# Patient Record
Sex: FEMALE | Race: WHITE | Employment: FULL TIME | URBAN - METROPOLITAN AREA
[De-identification: names, ages, dates, MRNs, and addresses within clinical notes are randomized per-mention and may not be internally consistent; named-entity substitution may affect disease eponyms.]

---

## 2019-03-06 ENCOUNTER — OFFICE VISIT (OUTPATIENT)
Dept: PRIMARY CARE CLINIC | Age: 54
End: 2019-03-06
Payer: COMMERCIAL

## 2019-03-06 VITALS
WEIGHT: 174 LBS | TEMPERATURE: 98.3 F | HEART RATE: 122 BPM | BODY MASS INDEX: 30.83 KG/M2 | HEIGHT: 63 IN | SYSTOLIC BLOOD PRESSURE: 101 MMHG | RESPIRATION RATE: 16 BRPM | OXYGEN SATURATION: 99 % | DIASTOLIC BLOOD PRESSURE: 68 MMHG

## 2019-03-06 DIAGNOSIS — J01.90 ACUTE BACTERIAL SINUSITIS: ICD-10-CM

## 2019-03-06 DIAGNOSIS — R05.9 COUGH: ICD-10-CM

## 2019-03-06 DIAGNOSIS — J02.9 SORE THROAT: Primary | ICD-10-CM

## 2019-03-06 DIAGNOSIS — B96.89 ACUTE BACTERIAL SINUSITIS: ICD-10-CM

## 2019-03-06 LAB — S PYO AG THROAT QL: NORMAL

## 2019-03-06 PROCEDURE — 3017F COLORECTAL CA SCREEN DOC REV: CPT | Performed by: PHYSICIAN ASSISTANT

## 2019-03-06 PROCEDURE — 1036F TOBACCO NON-USER: CPT | Performed by: PHYSICIAN ASSISTANT

## 2019-03-06 PROCEDURE — G8417 CALC BMI ABV UP PARAM F/U: HCPCS | Performed by: PHYSICIAN ASSISTANT

## 2019-03-06 PROCEDURE — G8484 FLU IMMUNIZE NO ADMIN: HCPCS | Performed by: PHYSICIAN ASSISTANT

## 2019-03-06 PROCEDURE — G8427 DOCREV CUR MEDS BY ELIG CLIN: HCPCS | Performed by: PHYSICIAN ASSISTANT

## 2019-03-06 PROCEDURE — 87880 STREP A ASSAY W/OPTIC: CPT | Performed by: PHYSICIAN ASSISTANT

## 2019-03-06 PROCEDURE — 99213 OFFICE O/P EST LOW 20 MIN: CPT | Performed by: PHYSICIAN ASSISTANT

## 2019-03-06 RX ORDER — ALBUTEROL SULFATE 90 UG/1
1-2 AEROSOL, METERED RESPIRATORY (INHALATION) EVERY 4 HOURS PRN
Qty: 1 INHALER | Refills: 3 | Status: SHIPPED | OUTPATIENT
Start: 2019-03-06

## 2019-03-06 RX ORDER — DEXTROMETHORPHAN HYDROBROMIDE AND PROMETHAZINE HYDROCHLORIDE 15; 6.25 MG/5ML; MG/5ML
5 SYRUP ORAL 4 TIMES DAILY PRN
Qty: 200 ML | Refills: 0 | Status: SHIPPED | OUTPATIENT
Start: 2019-03-06 | End: 2022-04-20

## 2019-03-06 RX ORDER — AMOXICILLIN AND CLAVULANATE POTASSIUM 875; 125 MG/1; MG/1
1 TABLET, FILM COATED ORAL 2 TIMES DAILY
Qty: 20 TABLET | Refills: 0 | Status: SHIPPED | OUTPATIENT
Start: 2019-03-06 | End: 2019-03-16

## 2019-03-06 ASSESSMENT — ENCOUNTER SYMPTOMS
WHEEZING: 1
COUGH: 1
SORE THROAT: 1
SHORTNESS OF BREATH: 1
RHINORRHEA: 0

## 2022-04-20 ENCOUNTER — OFFICE VISIT (OUTPATIENT)
Dept: INTERNAL MEDICINE | Age: 57
End: 2022-04-20
Payer: COMMERCIAL

## 2022-04-20 VITALS
WEIGHT: 186 LBS | TEMPERATURE: 97.8 F | BODY MASS INDEX: 34.23 KG/M2 | HEIGHT: 62 IN | HEART RATE: 107 BPM | OXYGEN SATURATION: 97 % | SYSTOLIC BLOOD PRESSURE: 116 MMHG | DIASTOLIC BLOOD PRESSURE: 68 MMHG

## 2022-04-20 DIAGNOSIS — J01.40 ACUTE PANSINUSITIS, RECURRENCE NOT SPECIFIED: Primary | ICD-10-CM

## 2022-04-20 PROCEDURE — 99203 OFFICE O/P NEW LOW 30 MIN: CPT | Performed by: NURSE PRACTITIONER

## 2022-04-20 RX ORDER — CETIRIZINE HYDROCHLORIDE 10 MG/1
10 TABLET ORAL DAILY
Qty: 30 TABLET | Refills: 0 | Status: SHIPPED | OUTPATIENT
Start: 2022-04-20 | End: 2022-05-20

## 2022-04-20 RX ORDER — FLUTICASONE PROPIONATE 50 MCG
1 SPRAY, SUSPENSION (ML) NASAL DAILY
Qty: 1 EACH | Refills: 1 | Status: SHIPPED | OUTPATIENT
Start: 2022-04-20

## 2022-04-20 SDOH — ECONOMIC STABILITY: FOOD INSECURITY: WITHIN THE PAST 12 MONTHS, YOU WORRIED THAT YOUR FOOD WOULD RUN OUT BEFORE YOU GOT MONEY TO BUY MORE.: NEVER TRUE

## 2022-04-20 SDOH — ECONOMIC STABILITY: FOOD INSECURITY: WITHIN THE PAST 12 MONTHS, THE FOOD YOU BOUGHT JUST DIDN'T LAST AND YOU DIDN'T HAVE MONEY TO GET MORE.: NEVER TRUE

## 2022-04-20 ASSESSMENT — SOCIAL DETERMINANTS OF HEALTH (SDOH): HOW HARD IS IT FOR YOU TO PAY FOR THE VERY BASICS LIKE FOOD, HOUSING, MEDICAL CARE, AND HEATING?: NOT HARD AT ALL

## 2022-04-20 ASSESSMENT — ENCOUNTER SYMPTOMS
SINUS PRESSURE: 1
SHORTNESS OF BREATH: 0
WHEEZING: 0
RHINORRHEA: 1
EYE ITCHING: 0
SINUS COMPLAINT: 1
COUGH: 1
EYE REDNESS: 0
EYE DISCHARGE: 0
SORE THROAT: 1

## 2022-04-20 ASSESSMENT — PATIENT HEALTH QUESTIONNAIRE - PHQ9
SUM OF ALL RESPONSES TO PHQ QUESTIONS 1-9: 0
1. LITTLE INTEREST OR PLEASURE IN DOING THINGS: 0
2. FEELING DOWN, DEPRESSED OR HOPELESS: 0
SUM OF ALL RESPONSES TO PHQ QUESTIONS 1-9: 0
SUM OF ALL RESPONSES TO PHQ9 QUESTIONS 1 & 2: 0
SUM OF ALL RESPONSES TO PHQ QUESTIONS 1-9: 0
SUM OF ALL RESPONSES TO PHQ QUESTIONS 1-9: 0

## 2022-04-20 NOTE — PATIENT INSTRUCTIONS
Patient Education        Sinusitis: Care Instructions  Your Care Instructions     Sinusitis is an infection of the lining of the sinus cavities in your head. Sinusitis often follows a cold. It causes pain and pressure in your head andface. In most cases, sinusitis gets better on its own in 1 to 2 weeks. But some mildsymptoms may last for several weeks. Sometimes antibiotics are needed. Follow-up care is a key part of your treatment and safety. Be sure to make and go to all appointments, and call your doctor if you are having problems. It's also a good idea to know your test results and keep alist of the medicines you take. How can you care for yourself at home?  Take an over-the-counter pain medicine, such as acetaminophen (Tylenol), ibuprofen (Advil, Motrin), or naproxen (Aleve). Read and follow all instructions on the label.  If the doctor prescribed antibiotics, take them as directed. Do not stop taking them just because you feel better. You need to take the full course of antibiotics.  Be careful when taking over-the-counter cold or flu medicines and Tylenol at the same time. Many of these medicines have acetaminophen, which is Tylenol. Read the labels to make sure that you are not taking more than the recommended dose. Too much acetaminophen (Tylenol) can be harmful.  Breathe warm, moist air from a steamy shower, a hot bath, or a sink filled with hot water. Avoid cold, dry air. Using a humidifier in your home may help. Follow the directions for cleaning the machine.  Use saline (saltwater) nasal washes. This can help keep your nasal passages open and wash out mucus and bacteria. You can buy saline nose drops at a grocery store or drugstore. Or you can make your own at home by adding 1 teaspoon of salt and 1 teaspoon of baking soda to 2 cups of distilled water. If you make your own, fill a bulb syringe with the solution, insert the tip into your nostril, and squeeze gently. Татьяна Swartz your nose.    Put a hot, wet towel or a warm gel pack on your face 3 or 4 times a day for 5 to 10 minutes each time.  Try a decongestant nasal spray like oxymetazoline (Afrin). Do not use it for more than 3 days in a row. Using it for more than 3 days can make your congestion worse. When should you call for help? Call your doctor now or seek immediate medical care if:     You have new or worse swelling or redness in your face or around your eyes.      You have a new or higher fever. Watch closely for changes in your health, and be sure to contact your doctor if:     You have new or worse facial pain.      The mucus from your nose becomes thicker (like pus) or has new blood in it.      You are not getting better as expected. Where can you learn more? Go to https://91datong.compecatarinaeb.Total Eclipse. org and sign in to your [employer] account. Enter Q695 in the [employer] box to learn more about \"Sinusitis: Care Instructions. \"     If you do not have an account, please click on the \"Sign Up Now\" link. Current as of: September 8, 2021               Content Version: 13.2  © 2006-2022 [employer]. Care instructions adapted under license by [facility] ([facility]). If you have questions about a medical condition or this instruction, always ask your healthcare professional. [place] [address] any warranty or liability for your use of this information. Patient Education        Sinusitis: Care Instructions  Your Care Instructions     Sinusitis is an infection of the lining of the sinus cavities in your head. Sinusitis often follows a cold. It causes pain and pressure in your head andface. In most cases, sinusitis gets better on its own in 1 to 2 weeks. But some mildsymptoms may last for several weeks. Sometimes antibiotics are needed. Follow-up care is a key part of your treatment and safety. Be sure to make and go to all appointments, and call your doctor if you are having problems.  It's also a good idea to know your test results and keep alist of the medicines you take. How can you care for yourself at home?  Take an over-the-counter pain medicine, such as acetaminophen (Tylenol), ibuprofen (Advil, Motrin), or naproxen (Aleve). Read and follow all instructions on the label.  If the doctor prescribed antibiotics, take them as directed. Do not stop taking them just because you feel better. You need to take the full course of antibiotics.  Be careful when taking over-the-counter cold or flu medicines and Tylenol at the same time. Many of these medicines have acetaminophen, which is Tylenol. Read the labels to make sure that you are not taking more than the recommended dose. Too much acetaminophen (Tylenol) can be harmful.  Breathe warm, moist air from a steamy shower, a hot bath, or a sink filled with hot water. Avoid cold, dry air. Using a humidifier in your home may help. Follow the directions for cleaning the machine.  Use saline (saltwater) nasal washes. This can help keep your nasal passages open and wash out mucus and bacteria. You can buy saline nose drops at a grocery store or drugstore. Or you can make your own at home by adding 1 teaspoon of salt and 1 teaspoon of baking soda to 2 cups of distilled water. If you make your own, fill a bulb syringe with the solution, insert the tip into your nostril, and squeeze gently. Debera Poplin your nose.  Put a hot, wet towel or a warm gel pack on your face 3 or 4 times a day for 5 to 10 minutes each time.  Try a decongestant nasal spray like oxymetazoline (Afrin). Do not use it for more than 3 days in a row. Using it for more than 3 days can make your congestion worse. When should you call for help? Call your doctor now or seek immediate medical care if:     You have new or worse swelling or redness in your face or around your eyes.      You have a new or higher fever.    Watch closely for changes in your health, and be sure to contact your doctor if:     You have new or worse facial pain.      The mucus from your nose becomes thicker (like pus) or has new blood in it.      You are not getting better as expected. Where can you learn more? Go to https://Fixmo Carrier Servicespeodelle(ye)BRAINeb.Meilapp.com. org and sign in to your [employer] account. Enter Z573 in the [facility] box to learn more about \"Sinusitis: Care Instructions. \"     If you do not have an account, please click on the \"Sign Up Now\" link. Current as of: September 8, 2021               Content Version: 13.2  © 9507-8378 Healthwise, Incorporated. Care instructions adapted under license by [facility] ([facility]). If you have questions about a medical condition or this instruction, always ask your healthcare professional. [address] any warranty or liability for your use of this information.

## 2022-04-20 NOTE — LETTER
[facility]  [address] [address]  Phone: [phone]  Fax: Liana Hobbs 281, APRN        April 20, 2022     Patient: Shemar Méndez   YOB: 1965   Date of Visit: 4/20/2022       To Whom it May Concern:    Shemar Méndez was seen in my clinic on 4/20/2022. She may return to work on 4/22/22. If you have any questions or concerns, please don't hesitate to call.     Sincerely,         COOPER Pena

## 2022-04-20 NOTE — PROGRESS NOTES
Ruben Trevino (:  1965) is a 49 y.o. female, New patient, here for evaluation of the following chief complaint(s):  Sinus Problem (x3 days cough wet, congestion, headache, ear ache both)      Vitals:    22 1815   BP: 116/68   Pulse: 107   Temp: 97.8 °F (36.6 °C)   SpO2: 97%       ASSESSMENT/PLAN:  1. Acute pansinusitis, recurrence not specified  -     fluticasone (FLONASE) 50 MCG/ACT nasal spray; 1 spray by Nasal route daily, Disp-1 each, R-1Normal  -     cetirizine (ZYRTEC) 10 MG tablet; Take 1 tablet by mouth daily, Disp-30 tablet, R-0Normal      Return if symptoms worsen or fail to improve, for follow up with PCP. SUBJECTIVE/OBJECTIVE:    Sinus Problem  This is a new problem. Episode onset: x3 days, cough, congestion, headache, and bilateral ear ache. The problem is unchanged. There has been no fever. Her pain is at a severity of 4/10. The pain is moderate. Associated symptoms include congestion, coughing, ear pain (bilateral), sinus pressure and a sore throat. Pertinent negatives include no chills, headaches, neck pain or shortness of breath. Treatments tried: otc cold medication. The treatment provided mild relief. Review of Systems   Constitutional: Positive for fatigue. Negative for chills and fever. HENT: Positive for congestion, ear pain (bilateral), postnasal drip, rhinorrhea, sinus pressure and sore throat. Eyes: Negative for discharge, redness and itching. Respiratory: Positive for cough. Negative for shortness of breath and wheezing. Cardiovascular: Negative for chest pain and palpitations. Musculoskeletal: Negative for arthralgias, myalgias and neck pain. Neurological: Negative for dizziness, light-headedness and headaches. Physical Exam  Vitals reviewed. Constitutional:       General: She is not in acute distress. Appearance: Normal appearance. HENT:      Right Ear: A middle ear effusion is present. Tympanic membrane is not erythematous.       Left Ear: A middle ear effusion is present. Tympanic membrane is not erythematous. Nose: Mucosal edema present. Right Turbinates: Swollen. Left Turbinates: Swollen. Right Sinus: Maxillary sinus tenderness and frontal sinus tenderness present. Left Sinus: Maxillary sinus tenderness and frontal sinus tenderness present. Mouth/Throat:      Lips: Pink. Mouth: Mucous membranes are moist.      Pharynx: Oropharynx is clear. No oropharyngeal exudate or posterior oropharyngeal erythema. Cardiovascular:      Rate and Rhythm: Normal rate and regular rhythm. Heart sounds: Normal heart sounds, S1 normal and S2 normal.   Pulmonary:      Effort: Pulmonary effort is normal. No respiratory distress. Breath sounds: Normal air entry. No decreased breath sounds, wheezing, rhonchi or rales. Skin:     General: Skin is warm and dry. Neurological:      Mental Status: She is alert and oriented to person, place, and time. Psychiatric:         Mood and Affect: Mood normal.         Behavior: Behavior is cooperative. An electronic signature was used to authenticate this note.     --COOPER Miranda

## 2022-05-13 ENCOUNTER — COMMUNITY OUTREACH (OUTPATIENT)
Dept: INTERNAL MEDICINE | Age: 57
End: 2022-05-13

## 2022-05-13 NOTE — PROGRESS NOTES
Patient's HM shows they are overdue for Mammogram, Colorectal Screening and Cervical Cancer Screening. [employer] [place] and  files searched without success. No results to attach to order nor HM updated. Note added to upcoming appointment to discuss Care Gap.         Fax sent to [facility] requesting Colonoscopy report

## 2022-05-13 NOTE — LETTER
Facsimile Transmission  Cover Sheet        Information contained in this transmission is for the sole use of the intended recipient(s) and may contain confidential and privileged information. Any unauthorized review, use, disclosure or distribution is prohibited. If you are not the intended recipient, please contact the sender for further instructions regarding the information.         To (Recipient):   [address] [place] [address] Records       Fax #:    [phone]          From:  [facility]) Physicians Fax Number:  [phone]    Sender:  Bebeto Nunez    Phone number:  [phone]    Date:  2022   Number of Pages:  ____________          Notes: Please fax most recent Colonoscopy (possibly 2017) results for              Anna Primrose  1965    _______________________________________________________________

## 2022-05-16 ENCOUNTER — OFFICE VISIT (OUTPATIENT)
Dept: INTERNAL MEDICINE | Age: 57
End: 2022-05-16
Payer: COMMERCIAL

## 2022-05-16 VITALS
DIASTOLIC BLOOD PRESSURE: 74 MMHG | BODY MASS INDEX: 34.6 KG/M2 | TEMPERATURE: 97.2 F | SYSTOLIC BLOOD PRESSURE: 120 MMHG | OXYGEN SATURATION: 99 % | HEIGHT: 62 IN | WEIGHT: 188 LBS | HEART RATE: 106 BPM

## 2022-05-16 DIAGNOSIS — R14.0 BLOATING SYMPTOM: ICD-10-CM

## 2022-05-16 DIAGNOSIS — R06.02 SHORTNESS OF BREATH: ICD-10-CM

## 2022-05-16 DIAGNOSIS — R53.83 FATIGUE, UNSPECIFIED TYPE: ICD-10-CM

## 2022-05-16 DIAGNOSIS — K62.5 RECTAL BLEEDING: ICD-10-CM

## 2022-05-16 DIAGNOSIS — R10.84 GENERALIZED ABDOMINAL PAIN: Primary | ICD-10-CM

## 2022-05-16 DIAGNOSIS — L30.9 DERMATITIS: ICD-10-CM

## 2022-05-16 DIAGNOSIS — F17.200 SMOKER: ICD-10-CM

## 2022-05-16 DIAGNOSIS — G44.221 CHRONIC TENSION-TYPE HEADACHE, INTRACTABLE: ICD-10-CM

## 2022-05-16 DIAGNOSIS — Z86.010 HISTORY OF COLONIC POLYPS: ICD-10-CM

## 2022-05-16 DIAGNOSIS — Z12.31 SCREENING MAMMOGRAM FOR BREAST CANCER: ICD-10-CM

## 2022-05-16 DIAGNOSIS — R00.2 PALPITATIONS: ICD-10-CM

## 2022-05-16 DIAGNOSIS — Z80.0 FAMILY HISTORY OF COLON CANCER: ICD-10-CM

## 2022-05-16 DIAGNOSIS — J45.20 MILD INTERMITTENT ASTHMA WITHOUT COMPLICATION: ICD-10-CM

## 2022-05-16 DIAGNOSIS — Z86.19 HISTORY OF HELICOBACTER PYLORI INFECTION: ICD-10-CM

## 2022-05-16 PROBLEM — G43.109 MIGRAINE WITH AURA AND WITHOUT STATUS MIGRAINOSUS, NOT INTRACTABLE: Status: ACTIVE | Noted: 2017-06-09

## 2022-05-16 PROBLEM — E55.9 VITAMIN D DEFICIENCY: Status: ACTIVE | Noted: 2017-06-09

## 2022-05-16 PROBLEM — J45.909 ASTHMA: Status: ACTIVE | Noted: 2017-07-24

## 2022-05-16 PROBLEM — Z86.0100 HISTORY OF COLONIC POLYPS: Status: ACTIVE | Noted: 2022-05-16

## 2022-05-16 PROBLEM — F51.01 PRIMARY INSOMNIA: Status: ACTIVE | Noted: 2017-06-09

## 2022-05-16 LAB
ALBUMIN SERPL-MCNC: 3.9 G/DL (ref 3.5–4.6)
ALP BLD-CCNC: 95 U/L (ref 40–130)
ALT SERPL-CCNC: 12 U/L (ref 0–33)
ANION GAP SERPL CALCULATED.3IONS-SCNC: 11 MEQ/L (ref 9–15)
AST SERPL-CCNC: 23 U/L (ref 0–35)
BASOPHILS ABSOLUTE: 0.1 K/UL (ref 0–0.2)
BASOPHILS RELATIVE PERCENT: 0.9 %
BILIRUB SERPL-MCNC: 0.4 MG/DL (ref 0.2–0.7)
BUN BLDV-MCNC: 15 MG/DL (ref 6–20)
CALCIUM SERPL-MCNC: 9.1 MG/DL (ref 8.5–9.9)
CHLORIDE BLD-SCNC: 108 MEQ/L (ref 95–107)
CO2: 24 MEQ/L (ref 20–31)
CREAT SERPL-MCNC: 0.56 MG/DL (ref 0.5–0.9)
EOSINOPHILS ABSOLUTE: 0.3 K/UL (ref 0–0.7)
EOSINOPHILS RELATIVE PERCENT: 4 %
GFR AFRICAN AMERICAN: >60
GFR NON-AFRICAN AMERICAN: >60
GLOBULIN: 3.2 G/DL (ref 2.3–3.5)
GLUCOSE BLD-MCNC: 90 MG/DL (ref 70–99)
HCT VFR BLD CALC: 40.8 % (ref 37–47)
HEMOGLOBIN: 13.1 G/DL (ref 12–16)
LYMPHOCYTES ABSOLUTE: 1.8 K/UL (ref 1–4.8)
LYMPHOCYTES RELATIVE PERCENT: 24.4 %
MCH RBC QN AUTO: 29.6 PG (ref 27–31.3)
MCHC RBC AUTO-ENTMCNC: 32.1 % (ref 33–37)
MCV RBC AUTO: 92.3 FL (ref 82–100)
MONOCYTES ABSOLUTE: 0.4 K/UL (ref 0.2–0.8)
MONOCYTES RELATIVE PERCENT: 5.8 %
NEUTROPHILS ABSOLUTE: 4.8 K/UL (ref 1.4–6.5)
NEUTROPHILS RELATIVE PERCENT: 64.9 %
PDW BLD-RTO: 16.5 % (ref 11.5–14.5)
PLATELET # BLD: 256 K/UL (ref 130–400)
POTASSIUM SERPL-SCNC: 4.3 MEQ/L (ref 3.4–4.9)
RBC # BLD: 4.42 M/UL (ref 4.2–5.4)
SODIUM BLD-SCNC: 143 MEQ/L (ref 135–144)
TOTAL PROTEIN: 7.1 G/DL (ref 6.3–8)
TSH REFLEX: 1.31 UIU/ML (ref 0.44–3.86)
WBC # BLD: 7.4 K/UL (ref 4.8–10.8)

## 2022-05-16 PROCEDURE — 99214 OFFICE O/P EST MOD 30 MIN: CPT | Performed by: NURSE PRACTITIONER

## 2022-05-16 RX ORDER — BUPROPION HYDROCHLORIDE 150 MG/1
150 TABLET, EXTENDED RELEASE ORAL 2 TIMES DAILY
Qty: 60 TABLET | Refills: 3 | Status: SHIPPED | OUTPATIENT
Start: 2022-05-16 | End: 2022-09-22 | Stop reason: ALTCHOICE

## 2022-05-16 RX ORDER — ALBUTEROL SULFATE 90 UG/1
2 AEROSOL, METERED RESPIRATORY (INHALATION) EVERY 6 HOURS PRN
Qty: 1 EACH | Refills: 1 | Status: SHIPPED | OUTPATIENT
Start: 2022-05-16 | End: 2022-06-13 | Stop reason: SDUPTHER

## 2022-05-16 RX ORDER — PERMETHRIN 50 MG/G
CREAM TOPICAL
Qty: 60 G | Refills: 1 | Status: CANCELLED | OUTPATIENT
Start: 2022-05-16

## 2022-05-16 ASSESSMENT — ENCOUNTER SYMPTOMS
WHEEZING: 0
BLOOD IN STOOL: 1
COUGH: 0
SHORTNESS OF BREATH: 1
RECTAL PAIN: 1
ABDOMINAL PAIN: 1
ABDOMINAL DISTENTION: 1
VOMITING: 0
NAUSEA: 1

## 2022-05-25 ENCOUNTER — HOSPITAL ENCOUNTER (OUTPATIENT)
Dept: CT IMAGING | Age: 57
Discharge: HOME | End: 2022-05-27
Payer: COMMERCIAL

## 2022-05-25 ENCOUNTER — HOSPITAL ENCOUNTER (OUTPATIENT)
Dept: WOMENS IMAGING | Age: 57
Discharge: HOME | End: 2022-05-27
Payer: COMMERCIAL

## 2022-05-25 DIAGNOSIS — K62.5 RECTAL BLEEDING: ICD-10-CM

## 2022-05-25 DIAGNOSIS — Z12.31 SCREENING MAMMOGRAM FOR BREAST CANCER: ICD-10-CM

## 2022-05-25 DIAGNOSIS — R10.84 GENERALIZED ABDOMINAL PAIN: ICD-10-CM

## 2022-05-25 PROCEDURE — 77063 BREAST TOMOSYNTHESIS BI: CPT

## 2022-05-25 PROCEDURE — 2500000003 HC RX 250 WO HCPCS: Performed by: NURSE PRACTITIONER

## 2022-05-25 PROCEDURE — 74177 CT ABD & PELVIS W/CONTRAST: CPT

## 2022-05-25 PROCEDURE — 6360000004 HC RX CONTRAST MEDICATION: Performed by: NURSE PRACTITIONER

## 2022-05-25 RX ADMIN — IOPAMIDOL 100 ML: 755 INJECTION, SOLUTION INTRAVENOUS at 09:54

## 2022-05-25 RX ADMIN — BARIUM SULFATE 450 ML: 20 SUSPENSION ORAL at 08:51

## 2022-05-26 NOTE — RESULT ENCOUNTER NOTE
Her CT of abdomen was unremarkable. Please encourage her to get scheduled with gastro as referred at her appt.

## 2022-05-27 ENCOUNTER — TELEPHONE (OUTPATIENT)
Dept: INTERNAL MEDICINE | Age: 57
End: 2022-05-27

## 2022-05-27 NOTE — TELEPHONE ENCOUNTER
Since her symptoms have been going on a little while, I do feel it is ok to be seen in 2 weeks by the specialist.

## 2022-05-27 NOTE — TELEPHONE ENCOUNTER
Pt has the consult scheduled for 6/17/22,(colonoscopy) wants to know if this is scheduled soon enough? She said that she was told to schedule asap, and that doesn't seem soon enough to her?

## 2022-05-27 NOTE — RESULT ENCOUNTER NOTE
Unremarkable means nothing abnormal and dangerous was found. Did show full of poop and some mildly enlarged lymph nodes in groin that are likely related to the stool backup but don't tell us anything otherwise. NO tumors or infection were seen. Needs the GI referral to have a colonoscopy to inspect inside her intestines to make sure nothing causing a blockage (like a tumor) contributing to her symptoms.  So, CT is good just need the colonoscopy for further info

## 2022-06-13 ENCOUNTER — OFFICE VISIT (OUTPATIENT)
Dept: GASTROENTEROLOGY | Age: 57
End: 2022-06-13
Payer: COMMERCIAL

## 2022-06-13 VITALS
WEIGHT: 185.6 LBS | SYSTOLIC BLOOD PRESSURE: 110 MMHG | HEIGHT: 62 IN | BODY MASS INDEX: 34.16 KG/M2 | DIASTOLIC BLOOD PRESSURE: 70 MMHG | HEART RATE: 87 BPM | OXYGEN SATURATION: 98 %

## 2022-06-13 DIAGNOSIS — K62.5 RECTAL BLEEDING: Primary | ICD-10-CM

## 2022-06-13 PROCEDURE — 99204 OFFICE O/P NEW MOD 45 MIN: CPT | Performed by: SPECIALIST

## 2022-06-13 RX ORDER — SODIUM, POTASSIUM,MAG SULFATES 17.5-3.13G
SOLUTION, RECONSTITUTED, ORAL ORAL
Qty: 354 ML | Refills: 0 | Status: SHIPPED | OUTPATIENT
Start: 2022-06-13 | End: 2022-08-22

## 2022-06-13 ASSESSMENT — ENCOUNTER SYMPTOMS
VOMITING: 0
ABDOMINAL PAIN: 1
EYES NEGATIVE: 1
RECTAL PAIN: 0
RESPIRATORY NEGATIVE: 1
DIARRHEA: 1
BLOOD IN STOOL: 1
ANAL BLEEDING: 0
ABDOMINAL DISTENTION: 0
CONSTIPATION: 0
NAUSEA: 0

## 2022-06-13 NOTE — PROGRESS NOTES
Gastroenterology Clinic Visit    [facility]  [id number]  Chief Complaint   Patient presents with    New Patient     Nephstephanie jacobsen with Stage 4 colon cancer in 12/2020.  Bloated    Abdominal Pain     last colonoscopy about 3 years ago.  Rectal Pain    Nausea    Diarrhea       HPI: 49 y.o. female presents to the clinic with history of altered bowel habits and rectal bleeding since last few months, patient feels that she has to strain more even when the stool consistency is soft. Patient also reports having tenesmus. ,  Patient also describes having abdominal discomfort and bloating and also has nausea and emesis especially when she tries to have a BM. Also reports having dizziness and diaphoresis, no history of known cardiac issues. No history of weight loss, no fever or chills. Ports seeing mucus in the stool social history smokes e-cigarettes does not drink alcohol, family history nephew has colon cancer  Review of Systems   Constitutional: Negative. HENT: Negative. Eyes: Negative. Respiratory: Negative. Cardiovascular: Positive for chest pain. Gastrointestinal: Positive for abdominal pain, blood in stool and diarrhea. Negative for abdominal distention, anal bleeding, constipation, nausea, rectal pain and vomiting. Endocrine: Negative. Genitourinary: Negative. Musculoskeletal: Negative. Skin: Negative. Allergic/Immunologic: Negative for food allergies. Neurological: Negative. Hematological: Negative. Psychiatric/Behavioral: Negative. Past Medical History:   Diagnosis Date    H. pylori infection     Hyperplastic colonic polyp       No past surgical history on file. Current Outpatient Medications on File Prior to Visit   Medication Sig Dispense Refill    betamethasone valerate (VALISONE) 0.1 % cream Apply topically 2 times daily.  50 g 2    buPROPion (WELLBUTRIN SR) 150 MG extended release tablet Take 1 tablet by mouth 2 times daily 60 tablet 3    fluticasone (FLONASE) 50 MCG/ACT nasal spray 1 spray by Nasal route daily (Patient taking differently: 1 spray by Nasal route daily as needed for Rhinitis or Allergies ) 1 each 1    albuterol sulfate HFA (PROAIR HFA) 108 (90 Base) MCG/ACT inhaler Inhale 1-2 puffs into the lungs every 4 hours as needed for Wheezing or Shortness of Breath 1 Inhaler 3     No current facility-administered medications on file prior to visit. Family History   Problem Relation Age of Onset   Shaye Me Cancer Mother         breast cancer    Breast Cancer Mother     Asthma Father     Migraines Sister     Cancer Nephew 29        colon    Colon Cancer Nephew 29        terminal      Social History     Socioeconomic History    Marital status:      Spouse name: None    Number of children: None    Years of education: None    Highest education level: None   Occupational History    None   Tobacco Use    Smoking status: Former Smoker     Quit date: 2019     Years since quitting: 3.4    Smokeless tobacco: Current User    Tobacco comment: vaps on occasion   Substance and Sexual Activity    Alcohol use: No     Alcohol/week: 0.0 standard drinks    Drug use: No    Sexual activity: None   Other Topics Concern    None   Social History Narrative    None     Social Determinants of Health     Financial Resource Strain: Low Risk     Difficulty of Paying Living Expenses: Not hard at all   Food Insecurity: No Food Insecurity    Worried About Running Out of Food in the Last Year: Never true    Sandeep of Food in the Last Year: Never true   Transportation Needs:     Lack of Transportation (Medical): Not on file    Lack of Transportation (Non-Medical):  Not on file   Physical Activity:     Days of Exercise per Week: Not on file    Minutes of Exercise per Session: Not on file   Stress:     Feeling of Stress : Not on file   Social Connections:     Frequency of Communication with Friends and Family: Not on file    Frequency of Social Gatherings with Friends and Family: Not on file    Attends Jehovah's witness Services: Not on file    Active Member of Clubs or Organizations: Not on file    Attends Club or Organization Meetings: Not on file    Marital Status: Not on file   Intimate Partner Violence:     Fear of Current or Ex-Partner: Not on file    Emotionally Abused: Not on file    Physically Abused: Not on file    Sexually Abused: Not on file   Housing Stability:     Unable to Pay for Housing in the Last Year: Not on file    Number of [place] in the Last Year: Not on file    Unstable Housing in the Last Year: Not on file       Blood pressure 110/70, pulse 87, height 5' 2\" (1.575 m), weight 185 lb 9.6 oz (84.2 kg), SpO2 98 %, not currently breastfeeding. Physical Exam  Constitutional:       Appearance: She is well-developed. HENT:      Head: Normocephalic and atraumatic. Eyes:      Conjunctiva/sclera: Conjunctivae normal.      Pupils: Pupils are equal, round, and reactive to light. Cardiovascular:      Rate and Rhythm: Normal rate. Pulmonary:      Effort: Pulmonary effort is normal.   Abdominal:      General: Bowel sounds are normal.      Palpations: Abdomen is soft. Comments: Soft surgical scar from previous cholecystectomy tenderness noted in the right lower quadrant and epigastric area   Musculoskeletal:         General: Normal range of motion. Cervical back: Normal range of motion. Skin:     General: Skin is warm. Neurological:      Mental Status: She is alert.          Laboratory, Pathology, Radiology reviewed indetail with relevant important investigations summarized below:  Lab Results   Component Value Date    WBC 7.4 05/16/2022    HGB 13.1 05/16/2022    HCT 40.8 05/16/2022    MCV 92.3 05/16/2022     05/16/2022     Lab Results   Component Value Date    ALT 12 05/16/2022    AST 23 05/16/2022    ALKPHOS 95 05/16/2022    BILITOT 0.4 05/16/2022       CT ABDOMEN PELVIS W IV CONTRAST Additional Contrast? Radiologist Recommendation    Result Date: 5/25/2022  EXAM: CT ABDOMEN PELVIS W IV CONTRAST COMPARISON: None. HISTORY:  61-year-old female with generalized abdominal pain and rectal bleeding. TECHNIQUE: Nonionic contrast enhanced helical abdomen and pelvis CT was performed. Coronal and sagittal reconstructions also obtained. Automated dose exposure control was used for this exam Contrast: 100 ml of intravenous Isovue 370 was administered. Oral contrast was also administered. FINDINGS: Support devices: Lower thorax: Limited evaluation of the lower chest demonstrates clear lung bases bilaterally. Solid organs: The liver, spleen, pancreas, and adrenal glands are normal. Biliary system: No evidence of biliary ductal dilatation. Gallbladder appears normal. Genitourinary: The kidneys are normal in appearance bilaterally with no evidence of hydronephrosis. No stones are appreciated. The bladder is unremarkable. Anteverted uterus demonstrates no evidence of masses. Gastrointestinal: The stomach, small and large bowel are normal in caliber without evidence of focal wall thickening. There is no evidence of bowel obstruction. Abundance of stool is visualized in the right bowel. No evidence of acute extravasation/bleeding is visualized within the bowel loops. Appendix: Within normal limits. No thickening of adjacent fat stranding. Fluid Collections:None Lymph nodes: Bilateral inguinal lymphadenopathy is seen. Vascular structures: Visualized vascular structures appear normal. Osseous and soft tissue structures: Bone windows demonstrate no suspicious osteolytic or osteoblastic lesions. No fracture identified. No evidence of acute abdominal pathology is seen. Abundance of stool is visualized in the large bowel. No evidence of adnexal masses is seen. Bilateral inguinal lymphadenopathy is seen.      [facility] DIGITAL SCREEN BILATERAL    Result Date: 5/28/2022  EXAMINATION: [facility] DIGITAL SCREEN BILATERAL CLINICAL HISTORY:Z12.31 Screening mammogram for breast cancer ICD10 COMPARISON: December 21, 2017 and May 24, 2017 FINDINGS:3-D tomosynthesis imaging of the bilateral breasts was performed. There are scattered fibroglandular densities within each breast.   There are no suspicious masses, areas of architectural distortion or suspicious areas of microcalcifications. CAD analysis was performed and used in the interpretation. BI-RADS 1: NEGATIVE MAMMOGRAM. ROUTINE FOLLOW-UP MAMMOGRAPHY IS SUGGESTED IN ONE YEAR. Board Certified Radiologists. Accredited by the ACR and FDA. MAMMOGRAPHY IS VERY IMPORTANT TO YOUR HEALTH. THE AMERICAN CANCER SOCIETY GUIDELINES RECOMMEND THAT WOMEN 36YEARS OF AGE AND OLDER SHOULD HAVE A MAMMOGRAM EVERY YEAR. A REMINDER LETTER WILL BE SENT AT THE APPROPRIATE TIME.  THIS FACILITY UTILIZES A REMINDER SYSTEM TO ENSURE ALL PATIENTS RECEIVE REMINDER NOTIFICATIONS AT THE APPROPRIATE TIME BASED ON THE RECOMMENDATIONS OF THIS EXAM. THIS INCLUDES REMINDERS FOR ROUTINE  SCREENING MAMMOGRAMS, DIAGNOSTIC MAMMOGRAMS IN WHICH THE PATIENT IS ASKED TO RETURN FOR ADDITIONAL VIEWS, OR OTHER BREAST IMAGING INTERVENTIONS WHEN APPROPRIATE. THE PATIENT WILL BE PLACED IN THE APPROPRIATE REMINDER SYSTEM INCLUDING A REMINDER AT THE APPROPRIATE TIME FOR ANY PENDING ADDITIONAL VIEWS. Endoscopic investigations:     Assessmentand Plan:  49 y.o. female with history of altered bowel habits with rectal bleeding and tenesmus, CT abdomen pelvis did not show any significant abnormality, rule out IBD or neoplasm or hemorrhoids, patient has a family history of colon cancer. We will schedule colonoscopy. Recent CBC and chemistry was unremarkable   Diagnosis Orders   1. Rectal bleeding  Endoscopy, colon, diagnostic     Return in about 2 months (around 8/13/2022).     Bernarda Santiago MD   Staff Gastroenterologist  [facility]    Please note this report has been partially produced using speech recognition software and may cause contain errors related to thatsystem including grammar, punctuation and spelling as well as words and phrases that may seem inappropriate. If there are questions or concerns please feel free to contact me to clarify.

## 2022-06-16 ENCOUNTER — ANESTHESIA (OUTPATIENT)
Dept: ENDOSCOPY | Age: 57
End: 2022-06-16
Payer: COMMERCIAL

## 2022-06-16 ENCOUNTER — ANCILLARY PROCEDURE (OUTPATIENT)
Dept: ENDOSCOPY | Age: 57
End: 2022-06-16
Attending: SPECIALIST
Payer: COMMERCIAL

## 2022-06-16 ENCOUNTER — ANESTHESIA EVENT (OUTPATIENT)
Dept: ENDOSCOPY | Age: 57
End: 2022-06-16
Payer: COMMERCIAL

## 2022-06-16 ENCOUNTER — HOSPITAL ENCOUNTER (OUTPATIENT)
Age: 57
Setting detail: OUTPATIENT SURGERY
Discharge: HOME | End: 2022-06-16
Attending: SPECIALIST | Admitting: SPECIALIST
Payer: COMMERCIAL

## 2022-06-16 VITALS
HEIGHT: 62 IN | OXYGEN SATURATION: 96 % | SYSTOLIC BLOOD PRESSURE: 108 MMHG | TEMPERATURE: 97.2 F | BODY MASS INDEX: 34.04 KG/M2 | RESPIRATION RATE: 18 BRPM | WEIGHT: 185 LBS | HEART RATE: 80 BPM | DIASTOLIC BLOOD PRESSURE: 60 MMHG

## 2022-06-16 DIAGNOSIS — K62.5 RECTAL BLEEDING: ICD-10-CM

## 2022-06-16 PROCEDURE — 7100000010 HC PHASE II RECOVERY - FIRST 15 MIN: Performed by: SPECIALIST

## 2022-06-16 PROCEDURE — 2580000003 HC RX 258: Performed by: SPECIALIST

## 2022-06-16 PROCEDURE — 2500000003 HC RX 250 WO HCPCS: Performed by: NURSE ANESTHETIST, CERTIFIED REGISTERED

## 2022-06-16 PROCEDURE — 88305 TISSUE EXAM BY PATHOLOGIST: CPT

## 2022-06-16 PROCEDURE — 6360000002 HC RX W HCPCS: Performed by: NURSE ANESTHETIST, CERTIFIED REGISTERED

## 2022-06-16 PROCEDURE — 45380 COLONOSCOPY AND BIOPSY: CPT | Performed by: SPECIALIST

## 2022-06-16 PROCEDURE — 2580000003 HC RX 258

## 2022-06-16 PROCEDURE — 3700000001 HC ADD 15 MINUTES (ANESTHESIA): Performed by: SPECIALIST

## 2022-06-16 PROCEDURE — 6370000000 HC RX 637 (ALT 250 FOR IP): Performed by: SPECIALIST

## 2022-06-16 PROCEDURE — 7100000011 HC PHASE II RECOVERY - ADDTL 15 MIN: Performed by: SPECIALIST

## 2022-06-16 PROCEDURE — 2709999900 HC NON-CHARGEABLE SUPPLY: Performed by: SPECIALIST

## 2022-06-16 PROCEDURE — 3700000000 HC ANESTHESIA ATTENDED CARE: Performed by: SPECIALIST

## 2022-06-16 PROCEDURE — 3609027000 HC COLONOSCOPY: Performed by: SPECIALIST

## 2022-06-16 RX ORDER — SODIUM CHLORIDE 9 MG/ML
INJECTION, SOLUTION INTRAVENOUS
Status: COMPLETED
Start: 2022-06-16 | End: 2022-06-16

## 2022-06-16 RX ORDER — MAGNESIUM HYDROXIDE 1200 MG/15ML
LIQUID ORAL PRN
Status: DISCONTINUED | OUTPATIENT
Start: 2022-06-16 | End: 2022-06-16 | Stop reason: ALTCHOICE

## 2022-06-16 RX ORDER — PROPOFOL 10 MG/ML
INJECTION, EMULSION INTRAVENOUS PRN
Status: DISCONTINUED | OUTPATIENT
Start: 2022-06-16 | End: 2022-06-16 | Stop reason: SDUPTHER

## 2022-06-16 RX ORDER — LIDOCAINE HYDROCHLORIDE 20 MG/ML
INJECTION, SOLUTION INFILTRATION; PERINEURAL PRN
Status: DISCONTINUED | OUTPATIENT
Start: 2022-06-16 | End: 2022-06-16 | Stop reason: SDUPTHER

## 2022-06-16 RX ORDER — SODIUM CHLORIDE 9 MG/ML
INJECTION, SOLUTION INTRAVENOUS CONTINUOUS
Status: DISCONTINUED | OUTPATIENT
Start: 2022-06-16 | End: 2022-06-16 | Stop reason: HOSPADM

## 2022-06-16 RX ORDER — SIMETHICONE 20 MG/.3ML
EMULSION ORAL PRN
Status: DISCONTINUED | OUTPATIENT
Start: 2022-06-16 | End: 2022-06-16 | Stop reason: ALTCHOICE

## 2022-06-16 RX ADMIN — LIDOCAINE HYDROCHLORIDE 30 MG: 20 INJECTION, SOLUTION INFILTRATION; PERINEURAL at 11:26

## 2022-06-16 RX ADMIN — SODIUM CHLORIDE 500 ML: 9 INJECTION, SOLUTION INTRAVENOUS at 10:30

## 2022-06-16 RX ADMIN — SODIUM CHLORIDE: 9 INJECTION, SOLUTION INTRAVENOUS at 10:56

## 2022-06-16 RX ADMIN — PROPOFOL 300 MG: 10 INJECTION, EMULSION INTRAVENOUS at 11:26

## 2022-06-16 ASSESSMENT — LIFESTYLE VARIABLES: SMOKING_STATUS: 1

## 2022-06-16 NOTE — ANESTHESIA POSTPROCEDURE EVALUATION
Department of Anesthesiology  Postprocedure Note    Patient: Elvis Hodge  MRN: [id number]  YOB: 1965  Date of evaluation: 6/16/2022  Time:  11:50 AM     Procedure Summary     Date: 06/16/22 Room / Location: [address] [place] [address]    Anesthesia Start: 7636 Anesthesia Stop:     Procedure: COLONOSCOPY WITH POLYPECTOMY (N/A ) Diagnosis:       Rectal bleeding      (Rectal bleeding [K62.5])    Surgeons: Kathryn Cleary MD Responsible Provider: COOPER Montalvo CRNA    Anesthesia Type: MAC ASA Status: 2          Anesthesia Type: No value filed. Argenis Phase I: Argenis Score: 10    Argenis Phase II:      Last vitals: Reviewed and per EMR flowsheets.        Anesthesia Post Evaluation    Patient location during evaluation: PACU  Patient participation: waiting for patient participation  Level of consciousness: sleepy but conscious  Pain score: 1  Airway patency: patent  Nausea & Vomiting: no nausea and no vomiting  Complications: no  Cardiovascular status: hemodynamically stable  Respiratory status: acceptable and nasal cannula  Hydration status: euvolemic  Comments: Report to RN, normal sinus rhythm

## 2022-06-16 NOTE — H&P
Patient Name: Isela Landa  : 1965  MRN: [id number]  DATE: 22      ENDOSCOPY  History and Physical    Procedure:    [x] Diagnostic Colonoscopy       [] Screening Colonoscopy  [] EGD      [] ERCP      [] EUS       [] Other    [x] Previous office notes/History and Physical reviewed from the patients chart. Please see EMR for further details of HPI. I have examined the patient's status immediately prior to the procedure and:      Indications/HPI:    []Abdominal Pain  []Cancer- GI/Lung  []Fhx of colon CA/polyps  []History of Polyps  []Telless   []Melena  []Abnormal Imaging  []Dysphagia    []Persistent Pneumonia  []Anemia  []Food Impaction  []History of Polyps  []GI Bleed  []Pulmonary nodule/Mass  []Change in bowel habits []Heartburn/Reflux  [x]Rectal Bleed (BRBPR)  []Chest Pain - Non Cardiac []Heme (+) Stoo  l[]Ulcers  []Constipation  []Hemoptysis   []Varices  []Diarrhea  []Hypoxemia  []Nausea/Vomiting  []Screening   []Crohns/Colitis  []Other:   Anesthesia:   [x] MAC [] Moderate Sedation   [] General   [] None     ROS: 12 pt Review of Symptoms was negative unless mentioned above    Medications:   Prior to Admission medications    Medication Sig Start Date End Date Taking? Authorizing Provider   Na Sulfate-K Sulfate-Mg Sulf (SUPREP) 17.5-3.13-1.6 GM/177ML SOLN solution As directed 22   Belle Lang MD   betamethasone valerate (VALISONE) 0.1 % cream Apply topically 2 times daily.  22   Saira COOPER Hsu - CNP   buPROPion [facility] SR) 150 MG extended release tablet Take 1 tablet by mouth 2 times daily 22   [facility] COOPER [place]su - AIME   fluticasone [facility]) 50 MCG/ACT nasal spray 1 spray by Nasal route daily  Patient not taking: Reported on 2022   COOPER Mata   albuterol sulfate HFA (PROAIR HFA) 108 (90 Base) MCG/ACT inhaler Inhale 1-2 puffs into the lungs every 4 hours as needed for Wheezing or Shortness of Breath 3/6/19   MARCE Frias       Allergies: No Known Allergies     History of allergic reaction to anesthesia:  No    Past Medical History:  Past Medical History:   Diagnosis Date    H. pylori infection     Hyperplastic colonic polyp        Past Surgical History:  History reviewed. No pertinent surgical history. Social History:  Social History     Tobacco Use    Smoking status: Former Smoker     Quit date: 2019     Years since quitting: 3.4    Smokeless tobacco: Current User    Tobacco comment: vaps on occasion   Vaping Use    Vaping Use: Some days    Substances: Nicotine   Substance Use Topics    Alcohol use: No     Alcohol/week: 0.0 standard drinks    Drug use: No       Vital Signs:   Vitals:    06/16/22 1023   BP: 109/67   Pulse: 83   Resp: 16   Temp: 97.2 °F (36.2 °C)   SpO2: 98%        Physical Exam:  Cardiac:  [x]WNL  []Comments:  Pulmonary:  [x]WNL   []Comments:   Neuro/Mental Status:  [x]WNL  []Comments:  Abdominal:  [x]WNL    []Comments:  Other:   []WNL  []Comments:    Informed Consent:  The risks and benefits of the procedure have been discussed with either the patient or if they cannot consent, their representative. Assessment:  Patient examined and appropriate for planned sedation and procedure. Plan:  Proceed with planned sedation and procedure as above.     Marlon Sher MD  11:15 AM

## 2022-06-16 NOTE — ANESTHESIA PRE PROCEDURE
Department of Anesthesiology  Preprocedure Note       Name:  Giuliano Jorgensen   Age:  49 y.o.  :  1965                                          MRN:  [id number]         Date:  2022      Surgeon: Miah Morris):  Gosia Garcia MD    Procedure: Procedure(s):  COLONOSCOPY DIAGNOSTIC    Medications prior to admission:   Prior to Admission medications    Medication Sig Start Date End Date Taking? Authorizing Provider   Na Sulfate-K Sulfate-Mg Sulf (SUPREP) 17.5-3.13-1.6 GM/177ML SOLN solution As directed 22   Gosia Garcia MD   betamethasone valerate (VALISONE) 0.1 % cream Apply topically 2 times daily. 22   COOPER Wheeler CNP   buPROPion [facility]) 150 MG extended release tablet Take 1 tablet by mouth 2 times daily 22   COOPER Wheeler CNP   fluticasone [facility]) 50 MCG/ACT nasal spray 1 spray by Nasal route daily  Patient not taking: Reported on 2022   COOPER Escamilla   albuterol sulfate HFA (PROAIR HFA) 108 (90 Base) MCG/ACT inhaler Inhale 1-2 puffs into the lungs every 4 hours as needed for Wheezing or Shortness of Breath 3/6/19   MARCE Gray       Current medications:    Current Facility-Administered Medications   Medication Dose Route Frequency Provider Last Rate Last Admin    0.9 % sodium chloride infusion   IntraVENous Continuous Gosia Garcia MD           Allergies:  No Known Allergies    Problem List:    Patient Active Problem List   Diagnosis Code    Chronic tension-type headache, intractable G44.221    Generalized abdominal pain R10.84    History of colonic polyps Z86.010    Asthma J45.909    Migraine with aura and without status migrainosus, not intractable G43. 109    Primary insomnia F51.01    Tobacco use disorder F17.200    Vitamin D deficiency E55.9       Past Medical History:        Diagnosis Date    H. pylori infection     Hyperplastic colonic polyp        Past Surgical History:  History reviewed.  No pertinent surgical history. Social History:    Social History     Tobacco Use    Smoking status: Former Smoker     Quit date: 2019     Years since quitting: 3.4    Smokeless tobacco: Current User    Tobacco comment: vaps on occasion   Substance Use Topics    Alcohol use: No     Alcohol/week: 0.0 standard drinks                                Ready to quit: Not Answered  Counseling given: Not Answered  Comment: vaps on occasion      Vital Signs (Current):   Vitals:    06/16/22 1023   BP: 109/67   Pulse: 83   Resp: 16   Temp: 36.2 °C (97.2 °F)   TempSrc: Temporal   SpO2: 98%   Weight: 185 lb (83.9 kg)   Height: 5' 2\" (1.575 m)                                              BP Readings from Last 3 Encounters:   06/16/22 109/67   06/13/22 110/70   05/16/22 120/74       NPO Status: Time of last liquid consumption: 0530                        Time of last solid consumption: 1800                        Date of last liquid consumption: 06/16/22                        Date of last solid food consumption: 06/14/22    BMI:   Wt Readings from Last 3 Encounters:   06/16/22 185 lb (83.9 kg)   06/13/22 185 lb 9.6 oz (84.2 kg)   05/16/22 188 lb (85.3 kg)     Body mass index is 33.84 kg/m². CBC:   Lab Results   Component Value Date    WBC 7.4 05/16/2022    RBC 4.42 05/16/2022    HGB 13.1 05/16/2022    HCT 40.8 05/16/2022    MCV 92.3 05/16/2022    RDW 16.5 05/16/2022     05/16/2022       CMP:   Lab Results   Component Value Date     05/16/2022    K 4.3 05/16/2022     05/16/2022    CO2 24 05/16/2022    BUN 15 05/16/2022    CREATININE 0.56 05/16/2022    GFRAA >60.0 05/16/2022    LABGLOM >60.0 05/16/2022    GLUCOSE 90 05/16/2022    PROT 7.1 05/16/2022    CALCIUM 9.1 05/16/2022    BILITOT 0.4 05/16/2022    ALKPHOS 95 05/16/2022    AST 23 05/16/2022    ALT 12 05/16/2022       POC Tests: No results for input(s): POCGLU, POCNA, POCK, POCCL, POCBUN, POCHEMO, POCHCT in the last 72 hours.     Coags: No results found for: PROTIME, INR, APTT    HCG (If Applicable): No results found for: PREGTESTUR, PREGSERUM, HCG, HCGQUANT     ABGs: No results found for: PHART, PO2ART, HHI4IWD, EYZ5BZG, BEART, U0RQHOZU     Type & Screen (If Applicable):  No results found for: LABABO, LABRH    Drug/Infectious Status (If Applicable):  No results found for: HIV, HEPCAB    COVID-19 Screening (If Applicable): No results found for: COVID19        Anesthesia Evaluation  Patient summary reviewed and Nursing notes reviewed  Airway: Mallampati: II  TM distance: >3 FB   Neck ROM: full  Mouth opening: > = 3 FB   Dental: normal exam         Pulmonary: breath sounds clear to auscultation  (+) asthma: current smoker                           Cardiovascular:  Exercise tolerance: no interval change,         NYHA Classification: II    Rhythm: regular  Rate: normal           Beta Blocker:  Not on Beta Blocker         Neuro/Psych:   (+) headaches: migraine headaches, tension headaches,             GI/Hepatic/Renal:   (+) bowel prep, morbid obesity          Endo/Other: Negative Endo/Other ROS                    Abdominal:   (+) obese,     Abdomen: soft. Vascular: negative vascular ROS. Other Findings:           Anesthesia Plan      MAC     ASA 2       Induction: intravenous. continuous noninvasive hemodynamic monitor    Anesthetic plan and risks discussed with patient. Plan discussed with attending.                     COOPER Miller - CRNA   6/16/2022

## 2022-08-22 ENCOUNTER — OFFICE VISIT (OUTPATIENT)
Dept: INTERNAL MEDICINE | Age: 57
End: 2022-08-22
Payer: COMMERCIAL

## 2022-08-22 VITALS
TEMPERATURE: 98.2 F | HEIGHT: 62 IN | HEART RATE: 83 BPM | BODY MASS INDEX: 33.86 KG/M2 | OXYGEN SATURATION: 99 % | RESPIRATION RATE: 16 BRPM | SYSTOLIC BLOOD PRESSURE: 116 MMHG | DIASTOLIC BLOOD PRESSURE: 68 MMHG | WEIGHT: 184 LBS

## 2022-08-22 DIAGNOSIS — K62.5 RECTAL BLEEDING: ICD-10-CM

## 2022-08-22 DIAGNOSIS — G44.221 CHRONIC TENSION-TYPE HEADACHE, INTRACTABLE: ICD-10-CM

## 2022-08-22 DIAGNOSIS — M79.7 FIBROMYALGIA: ICD-10-CM

## 2022-08-22 DIAGNOSIS — F51.01 PRIMARY INSOMNIA: ICD-10-CM

## 2022-08-22 DIAGNOSIS — R10.84 GENERALIZED ABDOMINAL PAIN: Primary | ICD-10-CM

## 2022-08-22 DIAGNOSIS — G43.109 MIGRAINE WITH AURA AND WITHOUT STATUS MIGRAINOSUS, NOT INTRACTABLE: ICD-10-CM

## 2022-08-22 PROCEDURE — 99214 OFFICE O/P EST MOD 30 MIN: CPT | Performed by: NURSE PRACTITIONER

## 2022-08-22 RX ORDER — GABAPENTIN 300 MG/1
300 CAPSULE ORAL 2 TIMES DAILY
Qty: 60 CAPSULE | Refills: 1 | Status: SHIPPED | OUTPATIENT
Start: 2022-08-22 | End: 2022-09-22

## 2022-08-22 RX ORDER — CYCLOBENZAPRINE HCL 10 MG
10 TABLET ORAL 3 TIMES DAILY PRN
Qty: 30 TABLET | Refills: 0 | Status: SHIPPED | OUTPATIENT
Start: 2022-08-22 | End: 2022-09-01

## 2022-08-22 ASSESSMENT — ENCOUNTER SYMPTOMS
BLOOD IN STOOL: 1
WHEEZING: 0
COLOR CHANGE: 0
ABDOMINAL PAIN: 1
COUGH: 0
DIARRHEA: 1
SHORTNESS OF BREATH: 0
RESPIRATORY NEGATIVE: 1

## 2022-08-22 NOTE — PROGRESS NOTES
[address] PRIMARY CARE  [address] [facility] [address]  Dept: [phone]  Dept Fax: [phone] Liana Joy (: 1965) is a 56 y.o. female, Established patient, here for evaluation of the following chief complaint(s):  Follow-up (Follow up from last appointment seeing GI on Wednesday)      PCP:  Noah Harris, APRN - CNP      Pt here today for f/u. Has had ongoing issues with her stomach. Is scheduled with GI in couple of days. Gets flares. Has been doing her own research. Tries not to eat a lot. Has given up all caffeine, chocolate, fatty foods, red meats for about 2-3 weeks d/t that is when her last flare up was. Was told has hemorrhoids which is why she has had bleeding. Pt is concerned with her headaches. Has gotten worse recently with cutting sugar and caffeine. Has always had migraines, was on prescription med to abort them years ago- not sure of name. Did not work, tried for a long time then stopped it. Her body aches are so bad. Her legs and hips are in so much pain. States \"I feel like when I talk about it, doctors just think I am exaggerating\". Her legs have gotten much worse in last couple of years. Can't lie on a hip for more than 10 min. Cramps in legs are becoming an every night thing, waking her 2-3 times a night which messes up her already poor sleep. Was dx with fibromyalgia many years ago, cymbalta was a new drug then. Didn't do well on it. Then had restless legs, was put on gabapentin. Review of Systems   Constitutional:  Positive for fatigue. Negative for activity change, appetite change, chills and diaphoresis. Respiratory: Negative. Negative for cough, shortness of breath and wheezing. Cardiovascular: Negative. Negative for chest pain, palpitations and leg swelling.    Gastrointestinal:  Positive for abdominal pain, blood in stool and diarrhea. Musculoskeletal:  Positive for arthralgias and myalgias. Negative for gait problem and joint swelling. Restless legs   Skin:  Negative for color change and rash. Neurological:  Positive for headaches. Negative for weakness. Psychiatric/Behavioral:  Positive for sleep disturbance. Negative for dysphoric mood. The patient is not nervous/anxious.       Past Medical History:   Diagnosis Date    H. pylori infection     Hyperplastic colonic polyp      Past Surgical History:   Procedure Laterality Date    COLONOSCOPY N/A 6/16/2022    COLONOSCOPY WITH POLYPECTOMY performed by Sammy Vásquez MD at [address] [place] [address] History    Marital status:      Spouse name: Not on file    Number of children: Not on file    Years of education: Not on file    Highest education level: Not on file   Occupational History    Not on file   Tobacco Use    Smoking status: Former     Types: Cigarettes     Quit date: 2019     Years since quitting: 3.6    Smokeless tobacco: Current    Tobacco comments:     vaps on occasion   Vaping Use    Vaping Use: Some days    Substances: Nicotine   Substance and Sexual Activity    Alcohol use: No     Alcohol/week: 0.0 standard drinks    Drug use: No    Sexual activity: Not on file   Other Topics Concern    Not on file   Social History Narrative    Not on file     Social Determinants of Health     Financial Resource Strain: Low Risk     Difficulty of Paying Living Expenses: Not hard at all   Food Insecurity: No Food Insecurity    Worried About Running Out of Food in the Last Year: Never true    Ran Out of Food in the Last Year: Never true   Transportation Needs: Not on file   Physical Activity: Not on file   Stress: Not on file   Social Connections: Not on file   Intimate Partner Violence: Not on file   Housing Stability: Not on file     Family History   Problem Relation Age of Onset    Cancer Mother         breast cancer    Breast Cancer Mother Cervical: No cervical adenopathy. Skin:     General: Skin is warm and dry. Neurological:      Mental Status: She is alert and oriented to person, place, and time. Psychiatric:         Mood and Affect: Mood normal.         Thought Content: Thought content normal.         ASSESSMENT/ PLAN    1. Generalized abdominal pain  Chronic, unchanged  -sees GI for f/u Wednesday  -check autoimmune labs  -discussed continuing with restrictive diet to see if can get symptoms to go away, then slowly add groups back to determine possible trigger. Has not given up gluten, advised to stick with what has done in recent weeks  - JHOAN Screen with Reflex; Future  - CCP Antibodies, IGG/IGA; Future  - Sedimentation Rate; Future  - C-Reactive Protein; Future  - Celiac Disease Panel; Future    2. Migraine with aura and without status migrainosus, not intractable  Chronic, worse suspect d/t chronic pain issues. Checking labs  - JHOAN Screen with Reflex; Future  - CCP Antibodies, IGG/IGA; Future  - Sedimentation Rate; Future  - C-Reactive Protein; Future  - Celiac Disease Panel; Future    3. Chronic tension-type headache, intractable  Chronic, unchanged  - JHOAN Screen with Reflex; Future  - CCP Antibodies, IGG/IGA; Future  - Sedimentation Rate; Future  - C-Reactive Protein; Future  - Celiac Disease Panel; Future    4. Rectal bleeding  Chronic intermittent issue, had colonoscopy recently. Keep f/u with GI. Check labs  - JHOAN Screen with Reflex; Future  - CCP Antibodies, IGG/IGA; Future  - Sedimentation Rate; Future  - C-Reactive Protein; Future  - Celiac Disease Panel; Future    5. Primary insomnia  Chronic, check labs  - JHOAN Screen with Reflex; Future  - CCP Antibodies, IGG/IGA; Future  - Sedimentation Rate; Future  - C-Reactive Protein; Future  - Celiac Disease Panel; Future    6.  Fibromyalgia  Chronic, has failed meds in past  -will try gabapentin (was only on 100mg qhs in past, never titrated by prev pcp), should help with headaches as well along with flexeril   - cyclobenzaprine (FLEXERIL) 10 MG tablet; Take 1 tablet by mouth 3 times daily as needed for Muscle spasms  Dispense: 30 tablet; Refill: 0  - gabapentin (NEURONTIN) 300 MG capsule; Take 1 capsule by mouth 2 times daily for 30 days. Dispense: 60 capsule; Refill: 1        Return in about 1 month (around 9/22/2022).      Electronically signed by:  COOPER Morales CNP   8/24/22

## 2022-08-24 ENCOUNTER — OFFICE VISIT (OUTPATIENT)
Dept: GASTROENTEROLOGY | Age: 57
End: 2022-08-24
Payer: COMMERCIAL

## 2022-08-24 VITALS
DIASTOLIC BLOOD PRESSURE: 70 MMHG | HEIGHT: 62 IN | WEIGHT: 180 LBS | SYSTOLIC BLOOD PRESSURE: 112 MMHG | BODY MASS INDEX: 33.13 KG/M2

## 2022-08-24 DIAGNOSIS — R10.13 EPIGASTRIC PAIN: Primary | ICD-10-CM

## 2022-08-24 PROCEDURE — 99213 OFFICE O/P EST LOW 20 MIN: CPT | Performed by: SPECIALIST

## 2022-08-24 ASSESSMENT — ENCOUNTER SYMPTOMS: ABDOMINAL PAIN: 1

## 2022-08-24 NOTE — PROGRESS NOTES
Gastroenterology Clinic Follow up Visit    Dick Sabillon  [id number]  Chief Complaint   Patient presents with    Follow-up     Follow up after colonoscopy still cramping and nausea        HPI and A/P at last visit summarized below:  Patient is here for follow-up, colonoscopy showed hyperplastic and tubular adenomatous polyp and there were small polyp, patient also has mild diverticulosis and hemorrhoid, about 2 weeks ago patient had sudden onset of severe pain in the lower and also in the left side of the abdomen associated nausea vomiting and diaphoresis, symptoms lasted about 45 minutes and it slowly subsided, since then patient put herself on a bland diet , patient has been avoiding dairy products and red meat and snacks. .  She had similar but less severe episode in the past and was diagnosed to have H. pylori gastritis based on stool studies,  shewas treated with antibiotics at that time. Currently patient is taking only 1 main meal a day. No weight loss. Has not had any fever. No further rectal bleeding. Review of Systems   Gastrointestinal:  Positive for abdominal pain. Past medical history, past surgical history, medication list, social and familyhistory reviewed    Blood pressure 112/70, height 5' 2\" (1.575 m), weight 180 lb (81.6 kg), last menstrual period 04/01/2021, not currently breastfeeding. Physical Exam  Constitutional:       Appearance: She is well-developed. HENT:      Head: Normocephalic and atraumatic. Eyes:      Conjunctiva/sclera: Conjunctivae normal.      Pupils: Pupils are equal, round, and reactive to light. Cardiovascular:      Rate and Rhythm: Normal rate. Pulmonary:      Effort: Pulmonary effort is normal.   Abdominal:      General: Bowel sounds are normal.      Palpations: Abdomen is soft.       Comments: Soft mild tenderness start in the epigastric area and minimal tenderness noted in the right lower quadrant area, surgical scar from cholecystectomy Musculoskeletal:         General: Normal range of motion. Cervical back: Normal range of motion. Skin:     General: Skin is warm. Neurological:      Mental Status: She is alert. Laboratory, Pathology, Radiology reviewed in detail with relevantimportant investigations summarized below:    No results for input(s): WBC, HGB, HCT, MCV, PLT in the last 720 hours. Lab Results   Component Value Date    ALT 12 05/16/2022    AST 23 05/16/2022    ALKPHOS 95 05/16/2022    BILITOT 0.4 05/16/2022     No results found. Endoscopic investigations:     Assessment and Plan:  Glenda Easley 56 y.o. female for follow up. History of abdominal pain associate with vomiting and diarrhea lasting for about 45 minutes and symptoms seem to have resolved , patient did not take any antibiotics at that time. Probably gastroenteritis given the duration and the fact that the symptoms resolved spontaneously. Patient also reports occasional diarrhea after a meal, also has epigastric discomfort and occasional pain in the left upper quadrant area and a prior history of H. pylori gastritis. Rule out recurrent HP gastritis or ulcer disease. We will schedule EGD      Return in about 2 years (around 8/24/2024). Taj Lee MD   StaffGastroenterologist  [facility]    Please note this report has been partially produced using speech recognitionsoftware  and may cause contain errors related to that system including grammar, punctuation and spelling as well as words andphrases that may seem inappropriate. If there are questions or concerns please feel free to contact me to clarify.

## 2022-09-19 DIAGNOSIS — R10.84 GENERALIZED ABDOMINAL PAIN: ICD-10-CM

## 2022-09-19 DIAGNOSIS — R79.89 HIGH CYCLIC CITRULLINATED PEPTIDE (CCP) ANTIBODY LEVEL: ICD-10-CM

## 2022-09-19 DIAGNOSIS — M25.50 POLYARTHRALGIA: ICD-10-CM

## 2022-09-19 DIAGNOSIS — G43.109 MIGRAINE WITH AURA AND WITHOUT STATUS MIGRAINOSUS, NOT INTRACTABLE: ICD-10-CM

## 2022-09-19 DIAGNOSIS — R79.82 ELEVATED C-REACTIVE PROTEIN (CRP): ICD-10-CM

## 2022-09-19 DIAGNOSIS — K62.5 RECTAL BLEEDING: ICD-10-CM

## 2022-09-19 DIAGNOSIS — R70.0 ELEVATED SEDIMENTATION RATE: ICD-10-CM

## 2022-09-19 DIAGNOSIS — G44.221 CHRONIC TENSION-TYPE HEADACHE, INTRACTABLE: ICD-10-CM

## 2022-09-19 DIAGNOSIS — F51.01 PRIMARY INSOMNIA: ICD-10-CM

## 2022-09-19 LAB
C-REACTIVE PROTEIN: 11.1 MG/L (ref 0–5)
SEDIMENTATION RATE, ERYTHROCYTE: 42 MM (ref 0–30)

## 2022-09-22 ENCOUNTER — OFFICE VISIT (OUTPATIENT)
Dept: INTERNAL MEDICINE | Age: 57
End: 2022-09-22
Payer: COMMERCIAL

## 2022-09-22 VITALS
WEIGHT: 190.4 LBS | RESPIRATION RATE: 16 BRPM | DIASTOLIC BLOOD PRESSURE: 70 MMHG | SYSTOLIC BLOOD PRESSURE: 108 MMHG | HEART RATE: 83 BPM | TEMPERATURE: 97.3 F | HEIGHT: 62 IN | OXYGEN SATURATION: 97 % | BODY MASS INDEX: 35.04 KG/M2

## 2022-09-22 DIAGNOSIS — M79.7 FIBROMYALGIA: ICD-10-CM

## 2022-09-22 DIAGNOSIS — G44.221 CHRONIC TENSION-TYPE HEADACHE, INTRACTABLE: Primary | ICD-10-CM

## 2022-09-22 DIAGNOSIS — G89.29 CHRONIC EAR PAIN, BILATERAL: ICD-10-CM

## 2022-09-22 DIAGNOSIS — J40 BRONCHITIS: ICD-10-CM

## 2022-09-22 DIAGNOSIS — H92.03 CHRONIC EAR PAIN, BILATERAL: ICD-10-CM

## 2022-09-22 DIAGNOSIS — M62.838 MUSCLE SPASM: ICD-10-CM

## 2022-09-22 DIAGNOSIS — F17.200 TOBACCO USE DISORDER: ICD-10-CM

## 2022-09-22 DIAGNOSIS — H93.13 TINNITUS OF BOTH EARS: ICD-10-CM

## 2022-09-22 LAB
ANA IGG, ELISA: NORMAL
CYCLIC CITRULLINATED PEPTIDE ANTIBODY IGG: 207 U/ML (ref 0–7)

## 2022-09-22 PROCEDURE — 99214 OFFICE O/P EST MOD 30 MIN: CPT | Performed by: NURSE PRACTITIONER

## 2022-09-22 RX ORDER — GABAPENTIN 300 MG/1
300 CAPSULE ORAL 3 TIMES DAILY
Qty: 90 CAPSULE | Refills: 3 | Status: SHIPPED | OUTPATIENT
Start: 2022-09-22 | End: 2022-11-03

## 2022-09-22 RX ORDER — CYCLOBENZAPRINE HCL 10 MG
10 TABLET ORAL 2 TIMES DAILY PRN
Qty: 60 TABLET | Refills: 3 | Status: SHIPPED | OUTPATIENT
Start: 2022-09-22

## 2022-09-22 RX ORDER — AZITHROMYCIN 250 MG/1
250 TABLET, FILM COATED ORAL SEE ADMIN INSTRUCTIONS
Qty: 6 TABLET | Refills: 0 | Status: SHIPPED | OUTPATIENT
Start: 2022-09-22 | End: 2022-09-27

## 2022-09-22 RX ORDER — CYCLOBENZAPRINE HCL 10 MG
10 TABLET ORAL 3 TIMES DAILY PRN
COMMUNITY
End: 2022-09-22 | Stop reason: SDUPTHER

## 2022-09-22 ASSESSMENT — ENCOUNTER SYMPTOMS
COUGH: 1
ABDOMINAL PAIN: 1
CONSTIPATION: 0
NAUSEA: 0
RHINORRHEA: 1
WHEEZING: 0
DIARRHEA: 0
SHORTNESS OF BREATH: 0
CHEST TIGHTNESS: 1
BLOOD IN STOOL: 0

## 2022-09-22 NOTE — PROGRESS NOTES
[address] PRIMARY CARE  [address] [facility] [address]  Dept: [phone]  Dept Fax: [phone]: [phone] Liana Joy (: 1965) is a 56 y.o. female, Established patient, here for evaluation of the following chief complaint(s):  Abdominal Pain (Abdominal pain has improved. EGD this coming Tuesday.), Migraine (Migraines have improved only slightly.), Pain (Patient reports she is still getting some leg cramps. Flexeril and Gabapentin has improved her pain.), Otalgia (Bilateral ear pain for several years. Patient reports tinnitus on and off.), and Cough (Cough X3 days. Patient does admit to vaping a lot more.)      PCP:  COOPER Romano CNP      Pt here today for 1 month f/u. Her abdominal pain is better, but admits that she is not doing the elimination diet. Saw GI and had cscope that was ok, so she thought could go back to normal. She is eating less though. Admits still gets bad cramping in her lower abdomen. Goes for EGD this Tues to look for h. Pylori. Has been treated for that 5x at least.     Migraines: When has them, they are bad. Feels are stress induced. Gets less than was \"quite a bit\". Gets bad migraines maybe twice a week, sometimes if gets a regular headache will last for 5 days. Stopped taking ibuprofen admits was taking way too much of it, stopped couple of months ago. Tylenol not helping. Leg cramps: Got a severe one 2 weeks ago behind her left knee, was miserable. Had a hard time getting rid of, was riding in [employer] when happened. Flexeril doesn't put her right asleep (has been dx with delayed sleep disorder). Has helped reduce her leg cramps significantly at night. 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 Climbs up and down stairs all day. Ear pain: has been having more ringing than normal lately.  Last week was really stuffy for few days, but feels moved to her chest. Has a moist bad cough. Admits has been vaping nicotine a lot more lately than usual. Is trying to decrease, went down a dose, feels is stress related as well. Review of Systems   Constitutional:  Positive for fatigue. Negative for fever. HENT:  Positive for ear pain and rhinorrhea. Negative for congestion. Respiratory:  Positive for cough and chest tightness. Negative for shortness of breath and wheezing. Cardiovascular: Negative. Negative for chest pain, palpitations and leg swelling. Gastrointestinal:  Positive for abdominal pain. Negative for blood in stool, constipation, diarrhea and nausea. Musculoskeletal:  Positive for arthralgias and myalgias. Neurological:  Positive for headaches. Psychiatric/Behavioral: Negative. Negative for dysphoric mood. The patient is not nervous/anxious.       Past Medical History:   Diagnosis Date    H. pylori infection     Hyperplastic colonic polyp      Past Surgical History:   Procedure Laterality Date    COLONOSCOPY N/A 6/16/2022    COLONOSCOPY WITH POLYPECTOMY performed by Shorty Mitchell MD at [address] [place] [address] History    Marital status:      Spouse name: Not on file    Number of children: Not on file    Years of education: Not on file    Highest education level: Not on file   Occupational History    Not on file   Tobacco Use    Smoking status: Former     Packs/day: 2.00     Years: 29.00     Pack years: 58.00     Types: Cigarettes     Start date: 38     Quit date: 2019     Years since quitting: 3.7    Smokeless tobacco: Current    Tobacco comments:     vapes on occasion   Vaping Use    Vaping Use: Some days    Substances: Nicotine   Substance and Sexual Activity    Alcohol use: No     Alcohol/week: 0.0 standard drinks    Drug use: No    Sexual activity: Not on file   Other Topics Concern    Not on file   Social History Narrative    Not on file     Social Determinants of Health     Financial Resource Strain: Low Risk     Difficulty of Paying Living Expenses: Not hard at all   Food Insecurity: No Food Insecurity    Worried About Running Out of Food in the Last Year: Never true    Ran Out of Food in the Last Year: Never true   Transportation Needs: Not on file   Physical Activity: Not on file   Stress: Not on file   Social Connections: Not on file   Intimate Partner Violence: Not on file   Housing Stability: Not on file     Family History   Problem Relation Age of Onset    Cancer Mother         breast cancer    Breast Cancer Mother     Asthma Father     Migraines Sister     Cancer Nephew 34        colon    Colon Cancer Nephew 34        terminal      No Known Allergies  Prior to Admission medications    Medication Sig Start Date End Date Taking? Authorizing Provider   gabapentin (NEURONTIN) 300 MG capsule Take 1 capsule by mouth 3 times daily for 30 days. 9/22/22 10/22/22 Yes COOPER Carter CNP   cyclobenzaprine (FLEXERIL) 10 MG tablet Take 1 tablet by mouth 2 times daily as needed for Muscle spasms Only taking once a day 9/22/22  Yes OCOPER Carter CNP   azithromycin (ZITHROMAX) 250 MG tablet Take 1 tablet by mouth See Admin Instructions for 5 days 500mg on day 1 followed by 250mg on days 2 - 5 9/22/22 9/27/22 Yes COOPER Carter CNP   betamethasone valerate (VALISONE) 0.1 % cream Apply topically 2 times daily. 5/16/22  Yes COOPER Carter CNP   fluticasone (FLONASE) 50 MCG/ACT nasal spray 1 spray by Nasal route daily 4/20/22  Yes COOPER Guerin   albuterol sulfate HFA (PROAIR HFA) 108 (90 Base) MCG/ACT inhaler Inhale 1-2 puffs into the lungs every 4 hours as needed for Wheezing or Shortness of Breath 3/6/19  Yes MARCE Sparks                I have reviewed the patient's medical history in detail and updated the computerized patient record.       OBJECTIVE    Vitals:    09/22/22 1512   BP: 108/70   Site: Right Upper Arm   Position: Sitting Cuff Size: Medium Adult   Pulse: 83   Resp: 16   Temp: 97.3 °F (36.3 °C)   TempSrc: Infrared   SpO2: 97%   Weight: 190 lb 6.4 oz (86.4 kg)   Height: 5' 2\" (1.575 m)       Physical Exam  Vitals and nursing note reviewed. Constitutional:       General: She is not in acute distress. Appearance: Normal appearance. HENT:      Right Ear: Tympanic membrane, ear canal and external ear normal.      Left Ear: Tympanic membrane, ear canal and external ear normal.   Eyes:      Conjunctiva/sclera: Conjunctivae normal.   Cardiovascular:      Rate and Rhythm: Normal rate and regular rhythm. Heart sounds: Normal heart sounds. Pulmonary:      Effort: Pulmonary effort is normal. No respiratory distress. Breath sounds: Normal breath sounds. Abdominal:      General: There is no distension. Palpations: Abdomen is soft. Tenderness: There is no abdominal tenderness. Musculoskeletal:      Cervical back: Normal range of motion and neck supple. Lymphadenopathy:      Cervical: No cervical adenopathy. Skin:     General: Skin is warm and dry. Neurological:      Mental Status: She is alert and oriented to person, place, and time. Psychiatric:         Mood and Affect: Mood normal.         Thought Content: Thought content normal.         ASSESSMENT/ PLAN    1. Chronic tension-type headache, intractable  Chronic, slightly improved  -continue flexeril  -stress is triggering some of symptoms it seems  - cyclobenzaprine (FLEXERIL) 10 MG tablet; Take 1 tablet by mouth 2 times daily as needed for Muscle spasms Only taking once a day  Dispense: 60 tablet; Refill: 3    2. Fibromyalgia  Chronic, some improvement with neurontin, but wears off midday, will add 3rd dose, continue flexeril  - gabapentin (NEURONTIN) 300 MG capsule; Take 1 capsule by mouth 3 times daily for 30 days. Dispense: 90 capsule; Refill: 3  - cyclobenzaprine (FLEXERIL) 10 MG tablet;  Take 1 tablet by mouth 2 times daily as needed for Muscle spasms Only taking once a day  Dispense: 60 tablet; Refill: 3  -reviewed recent labs- CRP, Sed rate and CCP antibodies all positive, waiting on few other test to result including JHOAN  -could have hpylori, going for EGD next week which could cause inflammation? 3. Bronchitis  Acute, smoker so will go ahead with tx  - azithromycin (ZITHROMAX) 250 MG tablet; Take 1 tablet by mouth See Admin Instructions for 5 days 500mg on day 1 followed by 250mg on days 2 - 5  Dispense: 6 tablet; Refill: 0    4. Muscle spasm  Chronic, improvement with meds, increase gabapentin, continue muscle relaxer  - gabapentin (NEURONTIN) 300 MG capsule; Take 1 capsule by mouth 3 times daily for 30 days. Dispense: 90 capsule; Refill: 3  - cyclobenzaprine (FLEXERIL) 10 MG tablet; Take 1 tablet by mouth 2 times daily as needed for Muscle spasms Only taking once a day  Dispense: 60 tablet; Refill: 3    5. Tobacco use disorder  Highly encourage patient to stay away from tobacco products. Patient is not ready to quit. Patient is aware of the risks associated with this habit. She is weaning down to next dose for her vape pen    6. Tinnitus of both ears  Chronic, worse lately. 7. Chronic ear pain, bilateral  Chronic, worse lately. Enc to start taking flonase daily as directed for at least a month. She wondered about ENT referral, but advised to try this first then if no improvement would refer        Return in about 6 weeks (around 11/3/2022).      Electronically signed by:  COOPER Mcgowan CNP   9/22/22

## 2022-09-23 LAB — CELIAC PANEL: 10 UNITS (ref 0–19)

## 2022-09-27 ENCOUNTER — ANESTHESIA EVENT (OUTPATIENT)
Dept: ENDOSCOPY | Age: 57
End: 2022-09-27
Payer: COMMERCIAL

## 2022-09-27 ENCOUNTER — HOSPITAL ENCOUNTER (OUTPATIENT)
Age: 57
Setting detail: OUTPATIENT SURGERY
Discharge: HOME | End: 2022-09-27
Attending: SPECIALIST | Admitting: SPECIALIST
Payer: COMMERCIAL

## 2022-09-27 ENCOUNTER — ANESTHESIA (OUTPATIENT)
Dept: ENDOSCOPY | Age: 57
End: 2022-09-27
Payer: COMMERCIAL

## 2022-09-27 VITALS
TEMPERATURE: 96.9 F | RESPIRATION RATE: 16 BRPM | BODY MASS INDEX: 34.96 KG/M2 | HEIGHT: 62 IN | WEIGHT: 190 LBS | OXYGEN SATURATION: 99 % | SYSTOLIC BLOOD PRESSURE: 111 MMHG | HEART RATE: 96 BPM | DIASTOLIC BLOOD PRESSURE: 63 MMHG

## 2022-09-27 DIAGNOSIS — R10.13 EPIGASTRIC PAIN: ICD-10-CM

## 2022-09-27 PROCEDURE — 2580000003 HC RX 258: Performed by: SPECIALIST

## 2022-09-27 PROCEDURE — 6370000000 HC RX 637 (ALT 250 FOR IP): Performed by: SPECIALIST

## 2022-09-27 PROCEDURE — 2709999900 HC NON-CHARGEABLE SUPPLY: Performed by: SPECIALIST

## 2022-09-27 PROCEDURE — 6360000002 HC RX W HCPCS: Performed by: NURSE ANESTHETIST, CERTIFIED REGISTERED

## 2022-09-27 PROCEDURE — 43239 EGD BIOPSY SINGLE/MULTIPLE: CPT | Performed by: SPECIALIST

## 2022-09-27 PROCEDURE — 7100000011 HC PHASE II RECOVERY - ADDTL 15 MIN: Performed by: SPECIALIST

## 2022-09-27 PROCEDURE — 2580000003 HC RX 258

## 2022-09-27 PROCEDURE — 88305 TISSUE EXAM BY PATHOLOGIST: CPT

## 2022-09-27 PROCEDURE — 88342 IMHCHEM/IMCYTCHM 1ST ANTB: CPT

## 2022-09-27 PROCEDURE — 3700000000 HC ANESTHESIA ATTENDED CARE: Performed by: SPECIALIST

## 2022-09-27 PROCEDURE — 3609017100 HC EGD: Performed by: SPECIALIST

## 2022-09-27 PROCEDURE — 7100000010 HC PHASE II RECOVERY - FIRST 15 MIN: Performed by: SPECIALIST

## 2022-09-27 RX ORDER — SIMETHICONE 20 MG/.3ML
EMULSION ORAL PRN
Status: DISCONTINUED | OUTPATIENT
Start: 2022-09-27 | End: 2022-09-27 | Stop reason: ALTCHOICE

## 2022-09-27 RX ORDER — PROPOFOL 10 MG/ML
INJECTION, EMULSION INTRAVENOUS PRN
Status: DISCONTINUED | OUTPATIENT
Start: 2022-09-27 | End: 2022-09-27 | Stop reason: SDUPTHER

## 2022-09-27 RX ORDER — MAGNESIUM HYDROXIDE 1200 MG/15ML
LIQUID ORAL PRN
Status: DISCONTINUED | OUTPATIENT
Start: 2022-09-27 | End: 2022-09-27 | Stop reason: ALTCHOICE

## 2022-09-27 RX ORDER — SODIUM CHLORIDE 9 MG/ML
INJECTION, SOLUTION INTRAVENOUS CONTINUOUS
Status: DISCONTINUED | OUTPATIENT
Start: 2022-09-27 | End: 2022-09-27 | Stop reason: HOSPADM

## 2022-09-27 RX ORDER — SODIUM CHLORIDE 9 MG/ML
INJECTION, SOLUTION INTRAVENOUS
Status: COMPLETED
Start: 2022-09-27 | End: 2022-09-27

## 2022-09-27 RX ADMIN — SODIUM CHLORIDE: 9 INJECTION, SOLUTION INTRAVENOUS at 10:44

## 2022-09-27 RX ADMIN — PROPOFOL 50 MG: 10 INJECTION, EMULSION INTRAVENOUS at 11:33

## 2022-09-27 RX ADMIN — PROPOFOL 100 MG: 10 INJECTION, EMULSION INTRAVENOUS at 11:30

## 2022-09-27 RX ADMIN — PROPOFOL 50 MG: 10 INJECTION, EMULSION INTRAVENOUS at 11:37

## 2022-09-27 ASSESSMENT — PAIN - FUNCTIONAL ASSESSMENT: PAIN_FUNCTIONAL_ASSESSMENT: 0-10

## 2022-09-27 ASSESSMENT — LIFESTYLE VARIABLES: SMOKING_STATUS: 1

## 2022-09-27 NOTE — DISCHARGE INSTRUCTIONS
Lower Discharge Instructions    Patient Name: Germania Wolf  Patient ID: [id number]  YOB: 1965  Procedure: Wanda Ivy  Referring Physician: [unfilled]  Procedure Date: 9/27/2022    Recommendations:  []   Follow-up appointment with family physician in 4 weeks. []   Colonoscopy recommended in ***  years. []   Follow-up appointment with endoscopist in  Reports of your procedure and these recommendations have been sent to:  [unfilled]    Sedative medication given for procedures can slow your reaction time and coordination for many hours. If you receive medications, it is important for your safety to follow the instructions below for the remainder of the day:  BE TAKEN directly home from the center and rest quietly. DO NOT resume normal activities until tomorrow. Do NOT drive, return to work, or operate any machinery or power tools. Do NOT make any important personal or business decisions, sign any legal papers or perform any activity that depends on your full concentration power or mental judgement. Do NOT drink any alcohol or take nerve or sleeping drugs. They add to the effects of the medicine still present in your body.    [] (Checked if a biopsy or polyp removal was performed)  There is a slight risk of bleeding. If you had a biopsy or a polyp removed, we suggest that you follow the instructions below:  Do NOT take aspirin or similar anti-inflammatory medicine for ___ days. Do NOT exercise, jog, or do any heavy lifting or straining for 1 day. Potential common after effects and treatment following the procedure:  Mild abdominal pain, bloating, or excessive gas - rest, eat lightly, and use a heating pad. Redness and/or swelling where the IV was - apply heat and elevate. Symptoms to report to your physician:  Severe abdominal pain or bloating. Chills or fever above 101 degrees occurring within 24 hours after procedure. Large amounts of rectal bleeding that does not stop.  Small amount of rectal bleeding is not serious especially if hemorrhoids are present. Unable to keep down food or drink. IV site stays red and swollen for more than 2 days. In the case of an emergency, please go to the emergency room. If you are not having an emergency but are having some of the above symptoms please call the doctor's office at:  Dr. Aj Tucker [phone]   [unfilled]      I have read and understand the above instructions:            ____________________________         ____________________________  Patient or Patient Rep. Signature   Witness Signature      Date: 9/27/2022  Time:      Learning About Coronavirus [phone])  What is coronavirus (COVID-19)? COVID-19 is a disease caused by a type of coronavirus. This illness was first found in December 2019. It has since spread worldwide. Coronaviruses are a large group of viruses. They cause the common cold. They also cause more serious illnesses like [place] respiratory syndrome (MERS) and severe acute respiratory syndrome (SARS). COVID-19 is caused by a novel coronavirus. That means it's a new type that has not been seen in people before. What are the symptoms? COVID-19 symptoms may include:  Fever. Cough. Trouble breathing. Chills or repeated shaking with chills. Muscle and body aches. Headache. Sore throat. New loss of taste or smell. Vomiting. Diarrhea. In severe cases, COVID-19 can cause pneumonia and make it hard to breathe without help from a machine. It can cause death. How is it diagnosed? COVID-19 is diagnosed with a viral test. This may also be called a PCR test or antigen test. It looks for evidence of the virus in your breathing passages or lungs (respiratory system). The test is most often done on a sample from the nose, throat, or lungs. It's sometimes done on a sample of saliva. One way a sample is collected is by putting a long swab into the back of your nose.   If you have questions about COVID-19 testing, ask your doctor or go to cdc.gov to use the COVID-19 Viral Testing Tool. How is it treated? Mild cases of COVID-19 can be treated at home. Serious cases need treatment in the hospital. Treatment may include medicines, plus breathing support such as oxygen therapy or a ventilator. Some people may be placed on their belly to help their oxygen levels. Treatments that may help people who have COVID-19 include:  Antiviral medicines. These medicines treat viral infections. Immune-based therapy. These medicines help the immune system fight COVID-19. Examples include monoclonal antibodies. Blood thinners. These medicines help prevent blood clots. People with severe illness are at risk for blood clots. How can you protect yourself and others? Stay up to date on your COVID-19 vaccines. Avoid sick people, and stay away from others if you are sick. Stay at least 6 feet away from other people. Avoid crowds, especially inside. Get tested for COVID-19 before you have an indoor visit with people who don't live with you. Improve the airflow when you spend time indoors with people who don't live with you. If you can, open windows and doors. Or you can use a fan to blow air away from people and out a window. Cover your mouth with a tissue when you cough or sneeze. Wash your hands often, especially after you cough or sneeze. Use soap and water, and scrub for at least 20 seconds. If soap and water aren't available, use an alcohol-based hand . Avoid touching your mouth, nose, and eyes. Check the CDC website at cdc.gov for the most current information on how to protect yourself. And if you have questions, ask your doctor or go to cdc.gov to use the COVID-19 Quarantine and Isolation Calculator. Here are some other steps you may need to take. If you are not up to date on your COVID-19 vaccines:  Wear a mask with the best fit, protection, and comfort for you. A mask can protect you even when others aren't wearing one.  This might be especially important if you:  Have certain health conditions. Live with someone who has a compromised immune system. Live with someone who is not up to date on their COVID-19 vaccines. If you have been exposed to the virus AND are not up to date on your COVID-19 vaccines:  Talk to your doctor as soon as you can. Your doctor might have you take medicine to help prevent serious illness. Get a COVID-19 test. You may need to be tested more than once. And if your test is positive, follow the instructions below. Stay home. Try to separate from other people where you live. Don't go to school, work, or public areas. Wear a well-fitting mask around other people for a full 10 days. Avoid travel, and stay away from people at high risk for serious illness. Watch for symptoms. If you have been exposed AND either tested positive for COVID-19 in the last 90 days and have recovered or you are up to date on your COVID-19 vaccines:  Talk to your doctor as soon as you can. Your doctor may have you take medicine to help prevent serious illness. Get a COVID-19 test. Wait 5 days after you were last exposed. You may need to be tested more than once. And if your test is positive, follow the instructions below. Wear a well-fitting mask around other people for a full 10 days. Avoid travel and stay away from people at high risk for serious illness. Watch for symptoms. If you tested positive for COVID-19 in the last 90 days and have not recovered, another COVID-19 test may not be needed. If you're sick or test positive for COVID-19:  Talk to your doctor as soon as you can. Your doctor may have you take medicine to help prevent serious illness. Get a COVID-19 test unless you have already been tested. You may need to be tested more than once. Stay home. Leave only if you need to get medical care. If you were seriously ill or if you have a weakened immune system, you may need to isolate for several weeks.   For a full 10 days, or lips. You pass out (lose consciousness) or are very hard to wake up. You have loss of balance or trouble walking. You have trouble seeing out of one or both eyes. You have weakness or drooping on one side of the face. You have weakness or numbness in an arm or a leg. You have trouble speaking. You have a severe headache. You have a seizure. Call your doctor now or seek immediate medical care if:    You have moderate trouble breathing. (You can't speak a full sentence.)     You are coughing up blood. You have signs of low blood pressure. These include feeling lightheaded; being too weak to stand; and having cold, pale, clammy skin. Watch closely for changes in your health, and be sure to contact your doctor if:    Your symptoms get worse. You are not getting better as expected. You have new or worse symptoms of anxiety, depression, nightmares, or flashbacks. Call before you go to the doctor's office. Follow their instructions. And wear a mask. Where can you learn more? Go to https://[employer]. org and sign in to your [employer] account. Enter C008 in the [facility] box to learn more about \"Learning About Coronavirus (COVID-19). \"     If you do not have an account, please click on the \"Sign Up Now\" link. Current as of: July 28, 2022               Content Version: 13.4  © 2006-2022 [employer]wise[employer] Incorporated. Care instructions adapted under license by [facility] ([facility]). If you have questions about a medical condition or this instruction, always ask your healthcare professional. [place] [address] any warranty or liability for your use of this information.

## 2022-09-27 NOTE — ANESTHESIA PRE PROCEDURE
Department of Anesthesiology  Preprocedure Note       Name:  Daniella Nath   Age:  56 y.o.  :  1965                                          MRN:  [id number]         Date:  2022      Surgeon: Leandra Cordero):  Kary Kaplan MD    Procedure: Procedure(s):  EGD ESOPHAGOGASTRODUODENOSCOPY    Medications prior to admission:   Prior to Admission medications    Medication Sig Start Date End Date Taking? Authorizing Provider   gabapentin (NEURONTIN) 300 MG capsule Take 1 capsule by mouth 3 times daily for 30 days. 9/22/22 10/22/22  Zondra Pollen, APRN - CNP   cyclobenzaprine (FLEXERIL) 10 MG tablet Take 1 tablet by mouth 2 times daily as needed for Muscle spasms Only taking once a day 22   Zondra Pollen, APRN - CNP   azithromycin (ZITHROMAX) 250 MG tablet Take 1 tablet by mouth See Admin Instructions for 5 days 500mg on day 1 followed by 250mg on days 2 - 5 22  Zondra Pollen, APRN - CNP   betamethasone valerate (VALISONE) 0.1 % cream Apply topically 2 times daily.  22   Zondra Pollen, APRN - CNP   fluticasone [facility]) 50 MCG/ACT nasal spray 1 spray by Nasal route daily 22   Estil Cords, APRN   albuterol sulfate HFA (PROAIR HFA) 108 (90 Base) MCG/ACT inhaler Inhale 1-2 puffs into the lungs every 4 hours as needed for Wheezing or Shortness of Breath 3/6/19   MARCE Chau       Current medications:    Current Facility-Administered Medications   Medication Dose Route Frequency Provider Last Rate Last Admin    sterile water for irrigation    PRN Kary Kaplan MD   1,000 mL at 22 1032    simethicone (MYLICON) 40 QZ/9.6IF drops    PRN Kary Kaplan MD   40 mg at 22 1032    0.9 % sodium chloride infusion   IntraVENous Continuous Kary Kaplan  mL/hr at 22 1044 New Bag at 22 1044       Allergies:  No Known Allergies    Problem List:    Patient Active Problem List   Diagnosis Code    Chronic tension-type headache, intractable G44.221    migraine headaches,             GI/Hepatic/Renal:   (+) GERD:,           Endo/Other: Negative Endo/Other ROS             Pt had no PAT visit       Abdominal:   (+) obese,           Vascular: negative vascular ROS. Other Findings:           Anesthesia Plan      MAC     ASA 3       Induction: intravenous. Anesthetic plan and risks discussed with patient. Use of blood products discussed with patient whom consented to blood products.    Plan discussed with surgical team.                    COOPER Crowder - CRNA   9/27/2022

## 2022-09-27 NOTE — H&P
Patient Name: Juvencio Jackson  : 1965  MRN: [id number]  DATE: 22      ENDOSCOPY  History and Physical    Procedure:    [] Diagnostic Colonoscopy       [] Screening Colonoscopy  [x] EGD      [] ERCP      [] EUS       [] Other    [x] Previous office notes/History and Physical reviewed from the patients chart. Please see EMR for further details of HPI. I have examined the patient's status immediately prior to the procedure and:      Indications/HPI:    [x]Abdominal Pain  []Cancer- GI/Lung  []Fhx of colon CA/polyps  []History of Polyps  []Telless   []Melena  []Abnormal Imaging  []Dysphagia    []Persistent Pneumonia  []Anemia  []Food Impaction  []History of Polyps  []GI Bleed  []Pulmonary nodule/Mass  []Change in bowel habits []Heartburn/Reflux  []Rectal Bleed (BRBPR)  []Chest Pain - Non Cardiac []Heme (+) Stoo  l[]Ulcers  []Constipation  []Hemoptysis   []Varices  []Diarrhea  []Hypoxemia  []Nausea/Vomiting  []Screening   []Crohns/Colitis  []Other:     Anesthesia:   [x] MAC [] Moderate Sedation   [] General   [] None     ROS: 12 pt Review of Symptoms was negative unless mentioned above    Medications:   Prior to Admission medications    Medication Sig Start Date End Date Taking? Authorizing Provider   gabapentin (NEURONTIN) 300 MG capsule Take 1 capsule by mouth 3 times daily for 30 days. 9/22/22 10/22/22  COOPER Kruse - CNP   cyclobenzaprine (FLEXERIL) 10 MG tablet Take 1 tablet by mouth 2 times daily as needed for Muscle spasms Only taking once a day 22   COOPER Kruse - CNP   azithromycin (ZITHROMAX) 250 MG tablet Take 1 tablet by mouth See Admin Instructions for 5 days 500mg on day 1 followed by 250mg on days 2 - 5 22  COOPER Kruse - CNP   betamethasone valerate (VALISONE) 0.1 % cream Apply topically 2 times daily.  22   COOPER Kruse - AIME   fluticasone [facility]) 50 MCG/ACT nasal spray 1 spray by Nasal route daily 22   COOPER Vernon albuterol sulfate HFA (PROAIR HFA) 108 (90 Base) MCG/ACT inhaler Inhale 1-2 puffs into the lungs every 4 hours as needed for Wheezing or Shortness of Breath 3/6/19   MARCE Alvarez       Allergies: No Known Allergies     History of allergic reaction to anesthesia:  No    Past Medical History:  Past Medical History:   Diagnosis Date    H. pylori infection     Hyperplastic colonic polyp        Past Surgical History:  Past Surgical History:   Procedure Laterality Date    CHOLECYSTECTOMY  1998    COLONOSCOPY N/A 06/16/2022    COLONOSCOPY WITH POLYPECTOMY performed by Estrada Ovalles MD at [address] History:  Social History     Tobacco Use    Smoking status: Former     Packs/day: 2.00     Years: 29.00     Pack years: 58.00     Types: Cigarettes     Start date: 1980     Quit date: 2019     Years since quitting: 3.7    Smokeless tobacco: Current    Tobacco comments:     vapes on occasion   Vaping Use    Vaping Use: Every day    Substances: Nicotine   Substance Use Topics    Alcohol use: No     Alcohol/week: 0.0 standard drinks    Drug use: No       Vital Signs:   Vitals:    09/27/22 1043   BP: 122/78   Pulse: 82   Resp: 18   Temp: 96.9 °F (36.1 °C)   SpO2: 100%        Physical Exam:  Cardiac:  [x]WNL  []Comments:  Pulmonary:  [x]WNL   []Comments:   Neuro/Mental Status:  [x]WNL  []Comments:  Abdominal:  [x]WNL    []Comments:  Other:   []WNL  []Comments:    Informed Consent:  The risks and benefits of the procedure have been discussed with either the patient or if they cannot consent, their representative. Assessment:  Patient examined and appropriate for planned sedation and procedure. Plan:  Proceed with planned sedation and procedure as above.     Estrada Ovalles MD  11:25 AM

## 2022-09-27 NOTE — ANESTHESIA POSTPROCEDURE EVALUATION
Department of Anesthesiology  Postprocedure Note    Patient: Damaris Land  MRN: [id number]  YOB: 1965  Date of evaluation: 9/27/2022      Procedure Summary     Date: 09/27/22 Room / Location: [facility] OR 01 / [facility]    Anesthesia Start: 1129 Anesthesia Stop: 1139    Procedure: EGD ESOPHAGOGASTRODUODENOSCOPY Diagnosis:       Epigastric pain      (Epigastric pain [R10.13])    Surgeons: Louise Lovelace MD Responsible Provider: COOPER Cruz CRNA    Anesthesia Type: MAC ASA Status: 3          Anesthesia Type: No value filed.     Argenis Phase I: Argenis Score: 10    Argenis Phase II:        Anesthesia Post Evaluation    Patient location during evaluation: bedside  Patient participation: complete - patient participated  Level of consciousness: awake and awake and alert  Airway patency: patent  Nausea & Vomiting: no nausea and no vomiting  Complications: no  Cardiovascular status: blood pressure returned to baseline and hemodynamically stable  Respiratory status: acceptable  Hydration status: euvolemic

## 2022-09-29 PROBLEM — R79.82 ELEVATED C-REACTIVE PROTEIN (CRP): Status: ACTIVE | Noted: 2022-09-29

## 2022-09-29 PROBLEM — R70.0 ELEVATED SEDIMENTATION RATE: Status: ACTIVE | Noted: 2022-09-29

## 2022-09-29 PROBLEM — R79.89 HIGH CYCLIC CITRULLINATED PEPTIDE (CCP) ANTIBODY LEVEL: Status: ACTIVE | Noted: 2022-09-29

## 2022-09-29 PROBLEM — M25.50 POLYARTHRALGIA: Status: ACTIVE | Noted: 2022-09-29

## 2022-09-29 RX ORDER — PANTOPRAZOLE SODIUM 40 MG/1
40 TABLET, DELAYED RELEASE ORAL
Qty: 28 TABLET | Refills: 0 | Status: SHIPPED | OUTPATIENT
Start: 2022-09-29 | End: 2022-11-03 | Stop reason: ALTCHOICE

## 2022-09-29 NOTE — PROGRESS NOTES
LMOM to return call regarding labs. Placing referral to rheumatologist.       Labs overall show signif inflamation, but negative for rheumatoid factor, still could be early rheumatoid arthritis or other rheum condition. Referral placed to [facility] rheum, wait lists are long so if can get in somewhere sooner, let me know and I will place referral there- [facility] I am assuming takes sooner if willing to drive. Advised if has questions to call staff for this message, and let me know so can call next week.

## 2022-10-12 NOTE — PROGRESS NOTES
I don't think antibiotics are causing inflammation. Dr. Michael Carrasco is treating her for h pylori, her biopsies confirmed she has this again.

## 2022-10-26 ENCOUNTER — OFFICE VISIT (OUTPATIENT)
Dept: GASTROENTEROLOGY | Age: 57
End: 2022-10-26
Payer: COMMERCIAL

## 2022-10-26 VITALS
HEART RATE: 110 BPM | WEIGHT: 195 LBS | DIASTOLIC BLOOD PRESSURE: 60 MMHG | OXYGEN SATURATION: 98 % | BODY MASS INDEX: 35.67 KG/M2 | SYSTOLIC BLOOD PRESSURE: 112 MMHG

## 2022-10-26 DIAGNOSIS — K58.9 IRRITABLE BOWEL SYNDROME, UNSPECIFIED TYPE: ICD-10-CM

## 2022-10-26 DIAGNOSIS — R10.30 LOWER ABDOMINAL PAIN: Primary | ICD-10-CM

## 2022-10-26 PROCEDURE — 99213 OFFICE O/P EST LOW 20 MIN: CPT | Performed by: SPECIALIST

## 2022-10-26 RX ORDER — DICYCLOMINE HYDROCHLORIDE 10 MG/1
10 CAPSULE ORAL 3 TIMES DAILY
Qty: 30 CAPSULE | Refills: 3 | Status: SHIPPED | OUTPATIENT
Start: 2022-10-26

## 2022-10-26 ASSESSMENT — ENCOUNTER SYMPTOMS
CONSTIPATION: 0
RESPIRATORY NEGATIVE: 1
BLOOD IN STOOL: 0
ABDOMINAL DISTENTION: 0
RECTAL PAIN: 0
VOMITING: 0
EYES NEGATIVE: 1
ABDOMINAL PAIN: 0
DIARRHEA: 0
GASTROINTESTINAL NEGATIVE: 1
NAUSEA: 0
ANAL BLEEDING: 0

## 2022-10-26 NOTE — PROGRESS NOTES
Cervical back: Normal range of motion. Skin:     General: Skin is warm. Neurological:      Mental Status: She is alert. Laboratory, Pathology, Radiology reviewed in detail with relevantimportant investigations summarized below:    No results for input(s): WBC, HGB, HCT, MCV, PLT in the last 720 hours. Lab Results   Component Value Date    ALT 12 05/16/2022    AST 23 05/16/2022    ALKPHOS 95 05/16/2022    BILITOT 0.4 05/16/2022     No results found. Endoscopic investigations:     Assessment and Plan:  Marykatherin Tatianna 56 y.o. female for follow up. Chronic H. pylori gastritis. Patient was treated with bismuth based therapy which she tolerated well and has not had any more dyspeptic symptoms. Reports occasional crampy abdominal pain. Will try Bentyl 10mg 3 times daily as needed      Return in about 6 months (around 4/26/2023). Jeannette Jacobsen MD   StaffGastroenterologist  [facility]    Please note this report has been partially produced using speech recognitionsoftware  and may cause contain errors related to that system including grammar, punctuation and spelling as well as words andphrases that may seem inappropriate. If there are questions or concerns please feel free to contact me to clarify.

## 2022-11-03 ENCOUNTER — OFFICE VISIT (OUTPATIENT)
Dept: INTERNAL MEDICINE | Age: 57
End: 2022-11-03
Payer: COMMERCIAL

## 2022-11-03 VITALS
WEIGHT: 194.4 LBS | HEIGHT: 62 IN | BODY MASS INDEX: 35.77 KG/M2 | DIASTOLIC BLOOD PRESSURE: 82 MMHG | SYSTOLIC BLOOD PRESSURE: 116 MMHG | RESPIRATION RATE: 16 BRPM | OXYGEN SATURATION: 99 % | HEART RATE: 98 BPM

## 2022-11-03 DIAGNOSIS — G89.29 CHRONIC EAR PAIN, BILATERAL: ICD-10-CM

## 2022-11-03 DIAGNOSIS — R10.84 GENERALIZED ABDOMINAL PAIN: ICD-10-CM

## 2022-11-03 DIAGNOSIS — M62.838 MUSCLE SPASM: ICD-10-CM

## 2022-11-03 DIAGNOSIS — G44.221 CHRONIC TENSION-TYPE HEADACHE, INTRACTABLE: Primary | ICD-10-CM

## 2022-11-03 DIAGNOSIS — M25.50 POLYARTHRALGIA: ICD-10-CM

## 2022-11-03 DIAGNOSIS — F51.01 PRIMARY INSOMNIA: ICD-10-CM

## 2022-11-03 DIAGNOSIS — Z87.891 PERSONAL HISTORY OF TOBACCO USE: ICD-10-CM

## 2022-11-03 DIAGNOSIS — H92.03 CHRONIC EAR PAIN, BILATERAL: ICD-10-CM

## 2022-11-03 PROCEDURE — 99214 OFFICE O/P EST MOD 30 MIN: CPT | Performed by: NURSE PRACTITIONER

## 2022-11-03 RX ORDER — AMITRIPTYLINE HYDROCHLORIDE 25 MG/1
25 TABLET, FILM COATED ORAL NIGHTLY
Qty: 30 TABLET | Refills: 1 | Status: SHIPPED | OUTPATIENT
Start: 2022-11-03

## 2022-11-03 ASSESSMENT — ENCOUNTER SYMPTOMS
DIARRHEA: 0
WHEEZING: 0
RESPIRATORY NEGATIVE: 1
NAUSEA: 0
COUGH: 0
SHORTNESS OF BREATH: 0
VOMITING: 0
CONSTIPATION: 0
ABDOMINAL PAIN: 1
BLOOD IN STOOL: 0
RHINORRHEA: 0

## 2022-11-03 NOTE — PROGRESS NOTES
[address] [id number] [facility]  [address] [facility] [address]  Dept: [phone]  Dept Fax: [phone]: [phone] Liana Joy (: 1965) is a 56 y.o. female, Established patient, here for evaluation of the following chief complaint(s):  Otalgia (Feels that the Flonase has been helping. Admits she forgets to take it every day.), Headache (Still having headaches that last about four days. Does not see improvement with the Flexeril.), Chronic Pain (Increased Gabapentin from two a day to thee a day at last visit. Feels that the increase has definitely helped her.), and Abdominal Pain (Patient did see Daylin Quick and completed treatment for H Pylori. Taking Bentyl PRN. Still sometimes gets upset stomach.)      PCP:  COOPER Lindsay CNP      Here for 6 week f/u on several issues. Ear pain: Does seem better when remembers to take flonase consistently. Admits to only using half days of week. Chronic pain:  Improved with increasing gabapentin frequency to three times a day, but her legs still hurt at night. Flexeril does help with her legs some but doesn't induce sleep as hoped. Insomnia: Doesn't sleep well which she thinks is somewhat from her pain, but admits has never been a good sleeper. Had sleep study years ago and suggested \"sleep therapy\" of some kind. Was told had delayed sleep syndrome. Lies in bed for hours. Doesn't fall asleep until 1am and has to get up at 5am. Has been this way for years. Has never taken meds for this to try and induce sleep. Admits got frustrated with sleep dr because they suggested was from her anxiety. Abdominal pain: EGD showed hpylori infection again, completed that treatment. Still with some lower abdominal cramping, Dr. Sheria Gitelman but has not tried it. He told her suspects IBS. Headaches: Remain uncontrolled.  Flexeril helps some, but still getting them regularly and last 3-4 days at time. Go away for day or 2 then come right back. Does feel are tension/stress headaches. Had an abortive med years ago, unsure which one. Had to take 1 at onset and repeat an hour later, but didn't notice much difference in her migraine headache symptoms. Blurry vision does happen when they get bad. Has never received call from rheumatologist to set up appt. Was referred end of Sept to Dr. Tank Church d/t having elevated crp and anti ccp levels. Review of Systems   Constitutional:  Positive for fatigue. Negative for unexpected weight change. HENT:  Negative for ear pain, rhinorrhea and tinnitus. Respiratory: Negative. Negative for cough, shortness of breath and wheezing. Cardiovascular: Negative. Negative for chest pain, palpitations and leg swelling. Gastrointestinal:  Positive for abdominal pain. Negative for blood in stool, constipation, diarrhea, nausea and vomiting. Musculoskeletal:  Positive for arthralgias, myalgias and neck pain. Neurological:  Positive for headaches. Negative for dizziness, tremors, seizures, syncope, facial asymmetry, speech difficulty, weakness, light-headedness and numbness. Psychiatric/Behavioral:  Positive for sleep disturbance. Negative for dysphoric mood. The patient is nervous/anxious (stress).       Past Medical History:   Diagnosis Date    H. pylori infection     Hyperplastic colonic polyp      Past Surgical History:   Procedure Laterality Date    CHOLECYSTECTOMY  1998    COLONOSCOPY N/A 06/16/2022    COLONOSCOPY WITH POLYPECTOMY performed by Elvin Sinclair MD at [address] 9/27/2022    EGD ESOPHAGOGASTRODUODENOSCOPY performed by Elvin Sinclair MD at [address] [place] [address] History    Marital status:      Spouse name: Not on file    Number of children: Not on file    Years of education: Not on file    Highest education level: Not on file   Occupational History    Not on file   Tobacco Use    Smoking status: Former     Packs/day: 2.00     Years: 29.00     Pack years: 58.00     Types: Cigarettes     Start date: 38     Quit date: 2019     Years since quitting: 3.8    Smokeless tobacco: Current    Tobacco comments:     vapes on occasion   Vaping Use    Vaping Use: Every day    Substances: Nicotine   Substance and Sexual Activity    Alcohol use: No     Alcohol/week: 0.0 standard drinks    Drug use: No    Sexual activity: Not on file   Other Topics Concern    Not on file   Social History Narrative    Not on file     Social Determinants of Health     Financial Resource Strain: Low Risk     Difficulty of Paying Living Expenses: Not hard at all   Food Insecurity: No Food Insecurity    Worried About Running Out of Food in the Last Year: Never true    Ran Out of Food in the Last Year: Never true   Transportation Needs: Not on file   Physical Activity: Not on file   Stress: Not on file   Social Connections: Not on file   Intimate Partner Violence: Not on file   Housing Stability: Not on file     Family History   Problem Relation Age of Onset    Cancer Mother         breast cancer    Breast Cancer Mother     Asthma Father     Migraines Sister     Cancer Nephew 34        colon    Colon Cancer Nephew 34        terminal      No Known Allergies  Prior to Admission medications    Medication Sig Start Date End Date Taking? Authorizing Provider   amitriptyline (ELAVIL) 25 MG tablet Take 1 tablet by mouth nightly 11/3/22  Yes COOPER Salazar CNP   dicyclomine (BENTYL) 10 MG capsule Take 1 capsule by mouth 3 times daily 10/26/22  Yes Dionne Bernheim, MD   gabapentin (NEURONTIN) 300 MG capsule Take 1 capsule by mouth 3 times daily for 30 days.  9/22/22 11/3/22 Yes COOPER Salazar CNP   cyclobenzaprine (FLEXERIL) 10 MG tablet Take 1 tablet by mouth 2 times daily as needed for Muscle spasms Only taking once a day 9/22/22  Yes Caro Galicia APRN - CNP   betamethasone valerate (VALISONE) 0.1 % cream Apply topically 2 times daily. 5/16/22  Yes Sven Blackburn APRN - CNP   fluticasone (FLONASE) 50 MCG/ACT nasal spray 1 spray by Nasal route daily 4/20/22  Yes COOPER Leavitt   albuterol sulfate HFA (PROAIR HFA) 108 (90 Base) MCG/ACT inhaler Inhale 1-2 puffs into the lungs every 4 hours as needed for Wheezing or Shortness of Breath 3/6/19  Yes MARCE Braswell                I have reviewed the patient's medical history in detail and updated the computerized patient record. OBJECTIVE    Vitals:    11/03/22 1549   BP: 116/82   Site: Left Upper Arm   Position: Sitting   Cuff Size: Medium Adult   Pulse: 98   Resp: 16   SpO2: 99%   Weight: 194 lb 6.4 oz (88.2 kg)   Height: 5' 2\" (1.575 m)       Physical Exam  Vitals and nursing note reviewed. Constitutional:       General: She is not in acute distress. Appearance: Normal appearance. HENT:      Right Ear: Tympanic membrane, ear canal and external ear normal.      Left Ear: Tympanic membrane, ear canal and external ear normal.   Cardiovascular:      Rate and Rhythm: Normal rate and regular rhythm. Heart sounds: Normal heart sounds. Pulmonary:      Effort: Pulmonary effort is normal. No respiratory distress. Breath sounds: Normal breath sounds. Musculoskeletal:      Cervical back: Normal range of motion and neck supple. Lymphadenopathy:      Cervical: No cervical adenopathy. Skin:     General: Skin is warm and dry. Neurological:      Mental Status: She is alert and oriented to person, place, and time. Psychiatric:         Mood and Affect: Mood normal.         Thought Content: Thought content normal.         ASSESSMENT/ PLAN    1. Chronic tension-type headache, intractable  Chronic, unchanged  -start preventative med since has never tried. Start on elavil d/t having IBS type pain, chronic myalgias and insomnia issues as well  - amitriptyline (ELAVIL) 25 MG tablet;  Take 1 tablet by mouth nightly  Dispense: 30 tablet; Refill: 1    2. Chronic ear pain, bilateral  Chronic, improved   -would be better controlled if used flonase daily as directed rather than only 50% of time. Enc to do better with taking daily    3. Generalized abdominal pain  Chronic, intermittent. Enc to take bentyl for prn spasms to see if helps  -also start elavil for this chronic pain. Reviewed SERGIO Langston's notes and procedures  - amitriptyline (ELAVIL) 25 MG tablet; Take 1 tablet by mouth nightly  Dispense: 30 tablet; Refill: 1    4. Muscle spasm  Chronic, improved with flexeril much worse without. Continue nightly use    5. Primary insomnia  Chronic, uncontrolled. Reports sleep study 6 yrs ago, but didn't f/u or accept any treatment  -will try elavil since addresses several of her complaints but do consider maybe hydroxyzine or even ambien since seems to have hard time initiating sleep. Once asleep mostly sleeps 4 hrs straight but not always. Could also consider belsomra  - amitriptyline (ELAVIL) 25 MG tablet; Take 1 tablet by mouth nightly  Dispense: 30 tablet; Refill: 1    6. Polyarthralgia  Chronic, gabapentin and flexeril help but still has issues  -never heard from rheum to set up appt. Will have staff reach out to Dr. Juwan Oro office as referral placed 9/29 d/t elev crp, sed rate,  and anti ccp antibodies    7. Personal history of tobacco use  Chronic, 58 pack yr hx. Quit 2 yrs ago. 2 ppd smoker. asymptomatic  - CT chest diagnostic low dose; Future, initial          Return in about 1 month (around 12/3/2022) for elavil recheck-insomnia, IBS pain, chronic headache.      Electronically signed by:  COOPER Gómez - CNP   11/3/22

## 2022-11-04 NOTE — PROGRESS NOTES
I called 's office to see what was going on with patient's referral. I spoke with the  there who let me know that Tarun Guaman is not scheduling patients at this time because he is full until the end of the year. She said she was going to ask  if she could start scheduling patient's for January but that is why patient has been unable to get in.

## 2023-02-20 ENCOUNTER — OFFICE VISIT (OUTPATIENT)
Dept: INTERNAL MEDICINE | Age: 58
End: 2023-02-20
Payer: COMMERCIAL

## 2023-02-20 VITALS
SYSTOLIC BLOOD PRESSURE: 102 MMHG | RESPIRATION RATE: 16 BRPM | HEIGHT: 62 IN | WEIGHT: 190.2 LBS | OXYGEN SATURATION: 99 % | BODY MASS INDEX: 35 KG/M2 | DIASTOLIC BLOOD PRESSURE: 78 MMHG | HEART RATE: 87 BPM

## 2023-02-20 DIAGNOSIS — F51.01 PRIMARY INSOMNIA: ICD-10-CM

## 2023-02-20 DIAGNOSIS — Z87.891 PERSONAL HISTORY OF TOBACCO USE: ICD-10-CM

## 2023-02-20 DIAGNOSIS — K58.8 OTHER IRRITABLE BOWEL SYNDROME: ICD-10-CM

## 2023-02-20 DIAGNOSIS — R79.89 HIGH CYCLIC CITRULLINATED PEPTIDE (CCP) ANTIBODY LEVEL: ICD-10-CM

## 2023-02-20 DIAGNOSIS — M25.50 POLYARTHRALGIA: Primary | ICD-10-CM

## 2023-02-20 DIAGNOSIS — R05.8 ALLERGIC COUGH: ICD-10-CM

## 2023-02-20 DIAGNOSIS — J30.9 ALLERGIC RHINITIS, UNSPECIFIED SEASONALITY, UNSPECIFIED TRIGGER: ICD-10-CM

## 2023-02-20 DIAGNOSIS — G44.221 CHRONIC TENSION-TYPE HEADACHE, INTRACTABLE: ICD-10-CM

## 2023-02-20 PROBLEM — R10.13 EPIGASTRIC PAIN: Status: RESOLVED | Noted: 2022-09-27 | Resolved: 2023-02-20

## 2023-02-20 PROBLEM — R10.84 GENERALIZED ABDOMINAL PAIN: Status: RESOLVED | Noted: 2022-05-16 | Resolved: 2023-02-20

## 2023-02-20 PROCEDURE — 99214 OFFICE O/P EST MOD 30 MIN: CPT | Performed by: NURSE PRACTITIONER

## 2023-02-20 PROCEDURE — G0296 VISIT TO DETERM LDCT ELIG: HCPCS | Performed by: NURSE PRACTITIONER

## 2023-02-20 RX ORDER — AMITRIPTYLINE HYDROCHLORIDE 25 MG/1
25 TABLET, FILM COATED ORAL NIGHTLY
Qty: 30 TABLET | Refills: 1 | Status: SHIPPED | OUTPATIENT
Start: 2023-02-20

## 2023-02-20 RX ORDER — PREDNISONE 10 MG/1
TABLET ORAL
Qty: 60 TABLET | Refills: 0 | Status: SHIPPED | OUTPATIENT
Start: 2023-02-20

## 2023-02-20 RX ORDER — ALBUTEROL SULFATE 90 UG/1
1-2 AEROSOL, METERED RESPIRATORY (INHALATION) EVERY 4 HOURS PRN
Qty: 1 EACH | Refills: 3 | Status: SHIPPED | OUTPATIENT
Start: 2023-02-20

## 2023-02-20 SDOH — ECONOMIC STABILITY: FOOD INSECURITY: WITHIN THE PAST 12 MONTHS, YOU WORRIED THAT YOUR FOOD WOULD RUN OUT BEFORE YOU GOT MONEY TO BUY MORE.: NEVER TRUE

## 2023-02-20 SDOH — ECONOMIC STABILITY: INCOME INSECURITY: HOW HARD IS IT FOR YOU TO PAY FOR THE VERY BASICS LIKE FOOD, HOUSING, MEDICAL CARE, AND HEATING?: NOT HARD AT ALL

## 2023-02-20 SDOH — ECONOMIC STABILITY: HOUSING INSECURITY
IN THE LAST 12 MONTHS, WAS THERE A TIME WHEN YOU DID NOT HAVE A STEADY PLACE TO SLEEP OR SLEPT IN A SHELTER (INCLUDING NOW)?: NO

## 2023-02-20 SDOH — ECONOMIC STABILITY: FOOD INSECURITY: WITHIN THE PAST 12 MONTHS, THE FOOD YOU BOUGHT JUST DIDN'T LAST AND YOU DIDN'T HAVE MONEY TO GET MORE.: NEVER TRUE

## 2023-02-20 ASSESSMENT — PATIENT HEALTH QUESTIONNAIRE - PHQ9
SUM OF ALL RESPONSES TO PHQ QUESTIONS 1-9: 0
SUM OF ALL RESPONSES TO PHQ9 QUESTIONS 1 & 2: 0
SUM OF ALL RESPONSES TO PHQ QUESTIONS 1-9: 0
SUM OF ALL RESPONSES TO PHQ QUESTIONS 1-9: 0
2. FEELING DOWN, DEPRESSED OR HOPELESS: 0
SUM OF ALL RESPONSES TO PHQ QUESTIONS 1-9: 0
1. LITTLE INTEREST OR PLEASURE IN DOING THINGS: 0

## 2023-02-20 ASSESSMENT — ENCOUNTER SYMPTOMS
ABDOMINAL PAIN: 1
RESPIRATORY NEGATIVE: 1
WHEEZING: 0
DIARRHEA: 0
CONSTIPATION: 0
BLOOD IN STOOL: 0
COUGH: 0
SHORTNESS OF BREATH: 0

## 2023-02-20 NOTE — PATIENT INSTRUCTIONS

## 2023-02-20 NOTE — PROGRESS NOTES
[address] PRIMARY CARE  1430 [facility] [address]  Dept: [phone]  Dept Fax: [phone]: 97 Liana Adam Said (: 1965) is a 56 y.o. female, Established patient, here for evaluation of the following chief complaint(s):  Arthritis (Swelling in her fingers and toes. Has gone down now. Never received a call from the referrals that were placed for her. She stopped taking the Gabapentin because she did not feel that it was effective and she did not like how it made her feel. ) and Other (Was supposed to have a CT of the chest but patient reports that it was not done because it needed to be ordered differently. )      PCP:  COOPER Salazar - CNP      Pt here today for re-eval and concerns. Arthritis pain: Still has not heard from rheum Dr. Pete Mayer office (was referred 2022 and again at last visit) d/t elevated antiCCP antibodies, CRP and sed rate. She called last week to make appt because her fingers have been swelling terrible and painful. Her right great toe joint started hurting last week as well. Has no strength in her hands and very stiff. She was hurting so badly last week that took a flexeril in the morning while at work. She actually didn't get too tired and feared. 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 She is getting really down about her pain. Headaches: Only taking the elavil few times a week d/t forgets to take it. Still getting a lot of headaches. Insomnia:  She still does take awhile to fall asleep- 2-3 hours and then sleeps really well when takes elavil. Has to take it early in the night so doesn't feel groggy next morning. If takes too late, she does. IBS- the bentyl does help, just takes some time. Has struggled with being motivated to adhere to diet and knows that is a lot of her issue with stomach pain.  Sometimes is really bad.     Her sinuses have also really been bothering her for few days. Admits hasn't been using flonase and when does seems to help. No fevers. Increase in headaches and sinus pressure. No ear pain. Review of Systems   Constitutional:  Positive for fatigue. Respiratory: Negative. Negative for cough, shortness of breath and wheezing. Cardiovascular: Negative. Negative for chest pain, palpitations and leg swelling. Gastrointestinal:  Positive for abdominal pain. Negative for blood in stool, constipation and diarrhea. Musculoskeletal:  Positive for arthralgias and joint swelling. Neurological:  Positive for headaches. Psychiatric/Behavioral:  Positive for sleep disturbance. Negative for dysphoric mood. The patient is not nervous/anxious.       Past Medical History:   Diagnosis Date    H. pylori infection     Hyperplastic colonic polyp      Past Surgical History:   Procedure Laterality Date    CHOLECYSTECTOMY      COLONOSCOPY N/A 2022    COLONOSCOPY WITH POLYPECTOMY performed by Lexie Gomez MD at [id number] 2022    EGD ESOPHAGOGASTRODUODENOSCOPY performed by Lexie Gomez MD at [facility] History    Marital status:      Spouse name: Not on file    Number of children: Not on file    Years of education: Not on file    Highest education level: Not on file   Occupational History    Not on file   Tobacco Use    Smoking status: Former     Packs/day: 2.00     Years: 29.00     Pack years: 58.00     Types: Cigarettes     Start date: 38     Quit date: 2019     Years since quittin.1    Smokeless tobacco: Current    Tobacco comments:     vapes on occasion   Vaping Use    Vaping Use: Every day    Substances: Nicotine   Substance and Sexual Activity    Alcohol use: No     Alcohol/week: 0.0 standard drinks    Drug use: No    Sexual activity: Not on file   Other Topics Concern    Not on file Social History Narrative    Not on file     Social Determinants of Health     Financial Resource Strain: Low Risk     Difficulty of Paying Living Expenses: Not hard at all   Food Insecurity: No Food Insecurity    Worried About [address] in the Last Year: Never true    [address] [facility] [address] in the Last Year: Never true   Transportation Needs: Unknown    Lack of Transportation (Medical): Not on file    Lack of Transportation (Non-Medical): No   Physical Activity: Not on file   Stress: Not on file   Social Connections: Not on file   Intimate Partner Violence: Not on file   Housing Stability: Unknown    Unable to Pay for Housing in the Last Year: Not on file    Number of Places Lived in the Last Year: Not on file    Unstable Housing in the Last Year: No     Family History   Problem Relation Age of Onset    Cancer Mother         breast cancer    Breast Cancer Mother     Asthma Father     Migraines Sister     Cancer Nephew 29        colon    Colon Cancer Nephew 34        terminal      No Known Allergies  Prior to Admission medications    Medication Sig Start Date End Date Taking?  Authorizing Provider   predniSONE (DELTASONE) 10 MG tablet Take 2 tablets by mouth twice a day for 5 days, then 2 tablets every morning and 1 tablet every evening x5 days, then 1 tab by mouth twice a day for 5 days, then 1 tab by mouth daily 2/20/23  Yes COOPER Lanier CNP   amitriptyline (ELAVIL) 25 MG tablet Take 1 tablet by mouth nightly 2/20/23  Yes COOPER Lanier CNP   albuterol sulfate HFA (PROAIR HFA) 108 (90 Base) MCG/ACT inhaler Inhale 1-2 puffs into the lungs every 4 hours as needed for Wheezing or Shortness of Breath 2/20/23  Yes COOPER Lanier CNP   dicyclomine (BENTYL) 10 MG capsule Take 1 capsule by mouth 3 times daily  Patient taking differently: Take 10 mg by mouth 3 times daily PRN 10/26/22  Yes Dash George MD   cyclobenzaprine (FLEXERIL) 10 MG tablet Take 1 tablet by mouth 2 times daily as needed for Muscle spasms Only taking once a day 9/22/22  Yes COOPER Avila CNP   betamethasone valerate (VALISONE) 0.1 % cream Apply topically 2 times daily. 5/16/22  Yes Melissa SearsCOOPER CNP   fluticasone (FLONASE) 50 MCG/ACT nasal spray 1 spray by Nasal route daily  Patient not taking: Reported on 2/20/2023 4/20/22   COOPER Melgoza have reviewed the patient's medical history in detail and updated the computerized patient record. OBJECTIVE    Vitals:    02/20/23 1422   BP: 102/78   Site: Right Upper Arm   Position: Sitting   Cuff Size: Medium Adult   Pulse: 87   Resp: 16   SpO2: 99%   Weight: 190 lb 3.2 oz (86.3 kg)   Height: 5' 2\" (1.575 m)       Physical Exam  Vitals and nursing note reviewed. Constitutional:       General: She is not in acute distress. Appearance: Normal appearance. HENT:      Right Ear: Tympanic membrane, ear canal and external ear normal.      Left Ear: Tympanic membrane, ear canal and external ear normal.      Nose: Congestion and rhinorrhea present. Comments: Watery discharge in nose and also some dried yellowish discharge. Nasal mucosa inflamed red     Mouth/Throat:      Mouth: Mucous membranes are moist.      Pharynx: Oropharynx is clear. Eyes:      Conjunctiva/sclera: Conjunctivae normal.   Cardiovascular:      Rate and Rhythm: Normal rate and regular rhythm. Heart sounds: Normal heart sounds. Pulmonary:      Effort: Pulmonary effort is normal. No respiratory distress. Breath sounds: Normal breath sounds. Musculoskeletal:      Right hand: Swelling, tenderness and bony tenderness present. Left hand: Swelling, tenderness and bony tenderness present. Cervical back: Normal range of motion and neck supple. Comments: Swelling and redness noted to MIP joints of index fingers bilat. Not warm. Hands are stiff and slow to make a fist.    Lymphadenopathy:      Cervical: No cervical adenopathy.    Skin:     General: Skin is warm and dry. Neurological:      Mental Status: She is alert and oriented to person, place, and time. Psychiatric:         Mood and Affect: Mood normal.         Thought Content: Thought content normal.         ASSESSMENT/ PLAN    1. Polyarthralgia  Chronic, worse  -suspect rheum arthritis with neg RF. Start on prednisone taper- discussed benefits and risks of long term prednisone. Want to start her on something for pain control while still waiting on rheumatology. Will send to [facility] since have referred twice to Dr. Christopher Earl office in [facility] without any response (not sure what happened?)  - predniSONE (DELTASONE) 10 MG tablet; Take 2 tablets by mouth twice a day for 5 days, then 2 tablets every morning and 1 tablet every evening x5 days, then 1 tab by mouth twice a day for 5 days, then 1 tab by mouth daily  Dispense: 60 tablet; Refill: 0  - External Referral to Rheumatology    2. Primary insomnia  Chronic, uncontrolled  -enc to take elavil earlier in evening so morning grogginess wears off. Med is helping her to get sound sleep, just takes her 2-3 hrs still to fall asleep. Advised could also try cutting in half if forgets to take early d/t busy work schedule  - amitriptyline (ELAVIL) 25 MG tablet; Take 1 tablet by mouth nightly  Dispense: 30 tablet; Refill: 1    3. 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 Chronic tension-type headache, intractable  Chronic, uncontrolled. Poor sleep, a lot of pain.   -take elavil nightly, do not miss doses so can see if helping  - amitriptyline (ELAVIL) 25 MG tablet; Take 1 tablet by mouth nightly  Dispense: 30 tablet; Refill: 1    5.  Irritable bowel syndrome  Chronic, unchanged. IBS. Bentyl helps some prn. Discussed role elavil can play with ibs  - amitriptyline (ELAVIL) 25 MG tablet; Take 1 tablet by mouth nightly  Dispense: 30 tablet; Refill: 1  -enc to follow diet more    6. Allergic Cough  Chronic, refill albuterol  - albuterol sulfate HFA (PROAIR HFA) 108 (90 Base) MCG/ACT inhaler; Inhale 1-2 puffs into the lungs every 4 hours as needed for Wheezing or Shortness of Breath  Dispense: 1 each; Refill: 3    7. Allergic rhinitis, unspecified seasonality, unspecified trigger  Chronic, worse.   -restart flonase and take daily as directed to avoid these rebounds    8. Personal history of tobacco use  - IA VISIT TO DISCUSS LUNG CA SCREEN W LDCT  - CT Lung Screen (Annual/Baseline); Future      Return in about 1 month (around 3/20/2023) for pain recheck. Electronically signed by:  COOPER Llanos CNP   2/20/23    Discussed with the patient the current USPSTF guidelines released March 9, 2021 for screening for lung cancer. For adults aged 48 to [de-identified] years who have a 20 pack-year smoking history and currently smoke or have quit within the past 15 years the grade B recommendation is to:  Screen for lung cancer with low-dose computed tomography (LDCT) every year. Stop screening once a person has not smoked for 15 years or has a health problem that limits life expectancy or the ability to have lung surgery. The patient  reports that she quit smoking about 4 years ago. Her smoking use included cigarettes. She started smoking about 43 years ago. She has a 58.00 pack-year smoking history. She uses smokeless tobacco.. Discussed with patient the risks and benefits of screening, including over-diagnosis, false positive rate, and total radiation exposure. The patient currently exhibits no signs or symptoms suggestive of lung cancer.   Discussed with patient the importance of compliance with yearly annual lung cancer screenings and willingness to undergo diagnosis and treatment if screening scan is positive. In addition, the patient was counseled regarding the importance of remaining smoke free and/or total smoking cessation.     Also reviewed the following if the patient has Medicare that as of February 10, 2022, Medicare only covers LDCT screening in patients aged 51-72 with at least a 20 pack-year smoking history who currently smoke or have quit in the last 15 years

## 2023-03-03 ENCOUNTER — HOSPITAL ENCOUNTER (OUTPATIENT)
Dept: CT IMAGING | Age: 58
Discharge: HOME | End: 2023-03-03
Payer: COMMERCIAL

## 2023-03-03 DIAGNOSIS — Z87.891 PERSONAL HISTORY OF TOBACCO USE: ICD-10-CM

## 2023-03-03 PROCEDURE — 71271 CT THORAX LUNG CANCER SCR C-: CPT

## 2023-03-06 ENCOUNTER — TELEPHONE (OUTPATIENT)
Dept: INTERNAL MEDICINE | Age: 58
End: 2023-03-06

## 2023-03-06 DIAGNOSIS — I31.39 PERICARDIAL EFFUSION: Primary | ICD-10-CM

## 2023-03-06 NOTE — TELEPHONE ENCOUNTER
Called and LMOM that her lung scan was normal in her lungs, continue with yearly screening. Did ask that she call back to discuss another incidental finding. If she calls back, the scan showed a small fluid collection in space around her heart. She is not having symptoms so that is reassuring, but need to get an echo (already ordered) and referred to cardio to evaluate why think this is.

## 2023-03-14 ENCOUNTER — PATIENT MESSAGE (OUTPATIENT)
Dept: INTERNAL MEDICINE | Age: 58
End: 2023-03-14

## 2023-03-15 ENCOUNTER — OFFICE VISIT (OUTPATIENT)
Dept: FAMILY MEDICINE CLINIC | Age: 58
End: 2023-03-15
Payer: COMMERCIAL

## 2023-03-15 VITALS
OXYGEN SATURATION: 96 % | HEART RATE: 93 BPM | RESPIRATION RATE: 16 BRPM | DIASTOLIC BLOOD PRESSURE: 74 MMHG | HEIGHT: 62 IN | SYSTOLIC BLOOD PRESSURE: 116 MMHG | TEMPERATURE: 97.9 F | BODY MASS INDEX: 36.58 KG/M2 | WEIGHT: 198.8 LBS

## 2023-03-15 DIAGNOSIS — H10.13 ALLERGIC CONJUNCTIVITIS OF BOTH EYES: Primary | ICD-10-CM

## 2023-03-15 PROCEDURE — 99213 OFFICE O/P EST LOW 20 MIN: CPT | Performed by: NURSE PRACTITIONER

## 2023-03-15 RX ORDER — AZELASTINE HYDROCHLORIDE 0.5 MG/ML
1 SOLUTION/ DROPS OPHTHALMIC 2 TIMES DAILY
Qty: 1 EACH | Refills: 0 | Status: SHIPPED | OUTPATIENT
Start: 2023-03-15

## 2023-03-15 ASSESSMENT — ENCOUNTER SYMPTOMS
EYE REDNESS: 1
RHINORRHEA: 0
EYE DISCHARGE: 1
COUGH: 0
SORE THROAT: 0
WHEEZING: 0
SHORTNESS OF BREATH: 0
COLOR CHANGE: 1
EYE ITCHING: 1
EYE PAIN: 0

## 2023-03-15 NOTE — TELEPHONE ENCOUNTER
From: Army Melton  To: Navid Shreya  Sent: 3/14/2023 6:58 PM EDT  Subject: PINK EYE     I have pink eye and was wondering if you can call in a prescription in for me without me being seen. 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 With all the appointments I have, Im trying not to miss anymore work. Thank you. Mrs. Fabian Vaz

## 2023-03-15 NOTE — PROGRESS NOTES
Omega Sanchez (:  1965) is a 56 y.o. female, Established patient, here for evaluation of the following chief complaint(s):  Eye Problem (Right eye redness, swelling and drainage. Started yesterday. )      Vitals:    03/15/23 0933   BP: 116/74   Pulse: 93   Resp: 16   Temp: 97.9 °F (36.6 °C)   SpO2: 96%       ASSESSMENT/PLAN:  1. Allergic conjunctivitis of both eyes  -     azelastine (OPTIVAR) 0.05 % ophthalmic solution; Place 1 drop into the right eye 2 times daily, Disp-1 each, R-0Normal        -     OTC antihistamine    Return if symptoms worsen or fail to improve. SUBJECTIVE/OBJECTIVE:    Eye Problem   Both eyes are affected. This is a new problem. The current episode started yesterday. The problem has been gradually worsening. There was no injury mechanism. The pain is at a severity of 0/10. The patient is experiencing no pain. There is No known exposure to pink eye. She Does not wear contacts. Associated symptoms include an eye discharge, eye redness and itching. Pertinent negatives include no fever. Associated symptoms comments: swelling. She has tried nothing for the symptoms. Review of Systems   Constitutional:  Negative for chills, fatigue and fever. HENT:  Negative for congestion, rhinorrhea and sore throat. Eyes:  Positive for discharge, redness and itching. Negative for pain. Eye swelling   Respiratory:  Negative for cough, shortness of breath and wheezing. Cardiovascular:  Negative for chest pain and palpitations. Skin:  Positive for color change. Physical Exam  Vitals reviewed. Constitutional:       General: She is not in acute distress. Appearance: Normal appearance. HENT:      Nose: Nose normal.      Mouth/Throat:      Lips: Pink. Mouth: Mucous membranes are moist.   Eyes:      General:         Right eye: Discharge present. Left eye: Discharge (mild) present. Extraocular Movements: Extraocular movements intact. Conjunctiva/sclera:      Right eye: Right conjunctiva is injected. Left eye: Left conjunctiva is injected (mild). Comments: Mild periorbital swelling to the right eye. Drainage noted. Yellow     Cardiovascular:      Rate and Rhythm: Normal rate and regular rhythm. Pulmonary:      Effort: Pulmonary effort is normal. No respiratory distress. Breath sounds: Normal air entry. Skin:     General: Skin is warm and dry. Neurological:      Mental Status: She is alert and oriented to person, place, and time. Psychiatric:         Behavior: Behavior is cooperative. An electronic signature was used to authenticate this note.     --COOPER Godinez

## 2023-03-22 ENCOUNTER — OFFICE VISIT (OUTPATIENT)
Dept: INTERNAL MEDICINE | Age: 58
End: 2023-03-22
Payer: COMMERCIAL

## 2023-03-22 VITALS
BODY MASS INDEX: 36.88 KG/M2 | DIASTOLIC BLOOD PRESSURE: 86 MMHG | HEIGHT: 62 IN | RESPIRATION RATE: 16 BRPM | OXYGEN SATURATION: 99 % | SYSTOLIC BLOOD PRESSURE: 122 MMHG | WEIGHT: 200.4 LBS | HEART RATE: 103 BPM

## 2023-03-22 DIAGNOSIS — J30.9 ALLERGIC RHINITIS, UNSPECIFIED SEASONALITY, UNSPECIFIED TRIGGER: ICD-10-CM

## 2023-03-22 DIAGNOSIS — R06.02 SHORTNESS OF BREATH: ICD-10-CM

## 2023-03-22 DIAGNOSIS — G44.221 CHRONIC TENSION-TYPE HEADACHE, INTRACTABLE: ICD-10-CM

## 2023-03-22 DIAGNOSIS — I83.813 VARICOSE VEINS OF BOTH LOWER EXTREMITIES WITH PAIN: ICD-10-CM

## 2023-03-22 DIAGNOSIS — R05.3 CHRONIC COUGH: ICD-10-CM

## 2023-03-22 DIAGNOSIS — M25.50 POLYARTHRALGIA: ICD-10-CM

## 2023-03-22 DIAGNOSIS — R79.89 HIGH CYCLIC CITRULLINATED PEPTIDE (CCP) ANTIBODY LEVEL: ICD-10-CM

## 2023-03-22 DIAGNOSIS — G25.81 RESTLESS LEGS: ICD-10-CM

## 2023-03-22 DIAGNOSIS — G43.109 MIGRAINE WITH AURA AND WITHOUT STATUS MIGRAINOSUS, NOT INTRACTABLE: ICD-10-CM

## 2023-03-22 DIAGNOSIS — I31.39 PERICARDIAL EFFUSION: ICD-10-CM

## 2023-03-22 DIAGNOSIS — F51.01 PRIMARY INSOMNIA: Primary | ICD-10-CM

## 2023-03-22 PROCEDURE — 99214 OFFICE O/P EST MOD 30 MIN: CPT | Performed by: NURSE PRACTITIONER

## 2023-03-22 RX ORDER — ZOLPIDEM TARTRATE 5 MG/1
5 TABLET ORAL NIGHTLY PRN
Qty: 30 TABLET | Refills: 1 | Status: SHIPPED | OUTPATIENT
Start: 2023-03-22 | End: 2023-04-21

## 2023-03-22 RX ORDER — FLUTICASONE PROPIONATE 50 MCG
1 SPRAY, SUSPENSION (ML) NASAL DAILY
Qty: 16 G | Refills: 5 | Status: SHIPPED | OUTPATIENT
Start: 2023-03-22

## 2023-03-22 RX ORDER — FLUTICASONE PROPIONATE 50 MCG
1 SPRAY, SUSPENSION (ML) NASAL DAILY
COMMUNITY
End: 2023-03-22 | Stop reason: SDUPTHER

## 2023-03-22 RX ORDER — OMEPRAZOLE 20 MG/1
20 CAPSULE, DELAYED RELEASE ORAL
Qty: 30 CAPSULE | Refills: 0 | Status: SHIPPED | OUTPATIENT
Start: 2023-03-22

## 2023-03-22 NOTE — PROGRESS NOTES
suggested in past. Denies any heart burn but does report chest pains sometimes in epigastric area/mid chest.     Lower legs have some veins that will swell when on feet too much. Left upper shin really puffs out at times and hurts. Review of Systems   Constitutional:         See HPI for ROS     Past Medical History:   Diagnosis Date    Epigastric pain 2022    Generalized abdominal pain 2022    H. pylori infection     Hyperplastic colonic polyp      Past Surgical History:   Procedure Laterality Date    CHOLECYSTECTOMY      COLONOSCOPY N/A 2022    COLONOSCOPY WITH POLYPECTOMY performed by Mercedes Jordan MD at [address] [facility] 2022    EGD ESOPHAGOGASTRODUODENOSCOPY performed by Mercedes Jordan MD at [address] [facility] History    Marital status:      Spouse name: Not on file    Number of children: Not on file    Years of education: Not on file    Highest education level: Not on file   Occupational History    Not on file   Tobacco Use    Smoking status: Former     Packs/day: 2.00     Years: 29.00     Pack years: 58.00     Types: Cigarettes     Start date: 38     Quit date:      Years since quittin.2    Smokeless tobacco: Current    Tobacco comments:     vapes on occasion   Vaping Use    Vaping Use: Every day    Substances: Nicotine   Substance and Sexual Activity    Alcohol use: No     Alcohol/week: 0.0 standard drinks    Drug use: No    Sexual activity: Not on file   Other Topics Concern    Not on file   Social History Narrative    Not on file     Social Determinants of Health     Financial Resource Strain: Low Risk     Difficulty of Paying Living Expenses: Not hard at all   Food Insecurity: No Food Insecurity    Worried About [address] in the Last Year: Never true    [address] [employer] in the Last Year: Never true   Transportation Needs: Unknown    Lack of Transportation (Medical):  Not

## 2023-03-23 PROBLEM — I83.813 VARICOSE VEINS OF BOTH LOWER EXTREMITIES WITH PAIN: Status: ACTIVE | Noted: 2023-03-23

## 2023-04-04 ENCOUNTER — HOSPITAL ENCOUNTER (OUTPATIENT)
Dept: NON INVASIVE DIAGNOSTICS | Age: 58
Discharge: HOME | End: 2023-04-04
Payer: COMMERCIAL

## 2023-04-04 DIAGNOSIS — I31.39 PERICARDIAL EFFUSION: ICD-10-CM

## 2023-04-04 LAB
LV EF: 60 %
LVEF MODALITY: NORMAL

## 2023-04-04 PROCEDURE — 93306 TTE W/DOPPLER COMPLETE: CPT

## 2023-04-25 ENCOUNTER — OFFICE VISIT (OUTPATIENT)
Dept: CARDIOLOGY CLINIC | Age: 58
End: 2023-04-25
Payer: COMMERCIAL

## 2023-04-25 VITALS
BODY MASS INDEX: 35.33 KG/M2 | HEART RATE: 91 BPM | RESPIRATION RATE: 15 BRPM | SYSTOLIC BLOOD PRESSURE: 116 MMHG | HEIGHT: 62 IN | DIASTOLIC BLOOD PRESSURE: 78 MMHG | WEIGHT: 192 LBS | OXYGEN SATURATION: 97 %

## 2023-04-25 DIAGNOSIS — I31.39 PERICARDIAL EFFUSION: Primary | ICD-10-CM

## 2023-04-25 DIAGNOSIS — R06.09 DOE (DYSPNEA ON EXERTION): ICD-10-CM

## 2023-04-25 DIAGNOSIS — R07.2 PRECORDIAL PAIN: ICD-10-CM

## 2023-04-25 PROCEDURE — 99204 OFFICE O/P NEW MOD 45 MIN: CPT | Performed by: INTERNAL MEDICINE

## 2023-04-25 PROCEDURE — 93000 ELECTROCARDIOGRAM COMPLETE: CPT | Performed by: INTERNAL MEDICINE

## 2023-04-25 NOTE — PROGRESS NOTES
2023    Charyl Jeans, APRN - CNP  [address] [place] [address]    RE: Robert Demarco   : 1965     Dear Dr. Charyl Jeans, APRN - CNP :    I had the pleasure of seeing your patient, Robert Demarco in the office today on 2023. As you are well aware, Ms. Simran Fuentes is a pleasant 56 y.o. {Race/ethnicity:70296} female who was kindly referred to me for evaluation of ***. Currently, reports that she is doing well. She  denies overt anginal and/or heart failure symptoms. Patient denies palpitations, near syncope, and/or syncope. Past Medical History: She  has a past medical history of Epigastric pain, Generalized abdominal pain, H. pylori infection, and Hyperplastic colonic polyp. Surgical History: She  has a past surgical history that includes Colonoscopy (N/A, 2022); Cholecystectomy (); and Upper gastrointestinal endoscopy (N/A, 2022). Social History: She  reports that she quit smoking about 4 years ago. Her smoking use included cigarettes. She started smoking about 43 years ago. She has a 58.00 pack-year smoking history. She uses smokeless tobacco. She reports that she does not drink alcohol and does not use drugs. Family History: family history includes Asthma in her father; Breast Cancer in her mother; Cancer in her mother; Cancer (age of onset: 29) in her nephew; Colon Cancer (age of onset: 29) in her nephew; Migraines in her sister.     Current medications:   Current Outpatient Medications   Medication Sig Dispense Refill    fluticasone (FLONASE) 50 MCG/ACT nasal spray 1 spray by Each Nostril route daily 16 g 5    omeprazole (PRILOSEC) 20 MG delayed release capsule Take 1 capsule by mouth every morning (before breakfast) 30 capsule 0    albuterol sulfate HFA (PROAIR HFA) 108 (90 Base) MCG/ACT inhaler Inhale 1-2 puffs into the lungs every 4 hours as needed for Wheezing or Shortness of Breath 1 each 3    cyclobenzaprine (FLEXERIL) 10 MG tablet Take 1 tablet
Migraines in her sister. Current medications:   Current Outpatient Medications   Medication Sig Dispense Refill    fluticasone (FLONASE) 50 MCG/ACT nasal spray 1 spray by Each Nostril route daily 16 g 5    omeprazole (PRILOSEC) 20 MG delayed release capsule Take 1 capsule by mouth every morning (before breakfast) 30 capsule 0    albuterol sulfate HFA (PROAIR HFA) 108 (90 Base) MCG/ACT inhaler Inhale 1-2 puffs into the lungs every 4 hours as needed for Wheezing or Shortness of Breath 1 each 3    cyclobenzaprine (FLEXERIL) 10 MG tablet Take 1 tablet by mouth 2 times daily as needed for Muscle spasms Only taking once a day 60 tablet 3     No current facility-administered medications for this visit. Allergies: Patient has no known allergies. Review of Systems   General: Fatigues easily. Cardiovascular: See HPI. No orthopnea or PND. Respiratory: Dyspnea is present with mild degrees of activity. + Asthma  Gastrointestinal: No melena or hematochezia. Genitourinary: No hematuria. Hematological: No easy bruising or bleeding. Vascular: No lower extremity edema or claudication. Neurological: No TIA or CVA symptoms. + Arm paresthesias. Musculoskeletal: No chest wall pain. Psychiatric: No anxiety. *All other systems were reviewed and found to be negative unless otherwise noted in the HPI*    Physical Examination: Her height is 5' 2\" (1.575 m) and weight is 192 lb (87.1 kg). Her blood pressure is 116/78 and her pulse is 91. Her respiration is 15 and oxygen saturation is 97%. She is alert and oriented to person, place, and time. No distress. Head is atraumatic. Moist mucous membranes. Neck is supple without JVD or carotid bruits. No thyroidmegaly. Lungs are clear to auscultation both anteriorly and posteriorly. No accessory muscle usage. Heart is a regular rate and rhythm. No murmurs. No S3 or S4. PMI is nondisplaced. Abdomen is soft and non tender. No hepatosplenomegaly.  No abdominal aortic bruits

## 2023-04-26 ENCOUNTER — OFFICE VISIT (OUTPATIENT)
Dept: GASTROENTEROLOGY | Age: 58
End: 2023-04-26
Payer: COMMERCIAL

## 2023-04-26 ENCOUNTER — OFFICE VISIT (OUTPATIENT)
Dept: INTERNAL MEDICINE | Age: 58
End: 2023-04-26
Payer: COMMERCIAL

## 2023-04-26 VITALS
WEIGHT: 194 LBS | BODY MASS INDEX: 35.7 KG/M2 | DIASTOLIC BLOOD PRESSURE: 80 MMHG | RESPIRATION RATE: 16 BRPM | SYSTOLIC BLOOD PRESSURE: 116 MMHG | HEART RATE: 96 BPM | OXYGEN SATURATION: 93 % | HEIGHT: 62 IN

## 2023-04-26 VITALS — BODY MASS INDEX: 35.33 KG/M2 | OXYGEN SATURATION: 99 % | WEIGHT: 192 LBS | HEART RATE: 78 BPM | HEIGHT: 62 IN

## 2023-04-26 DIAGNOSIS — R05.3 CHRONIC COUGH: ICD-10-CM

## 2023-04-26 DIAGNOSIS — F51.01 PRIMARY INSOMNIA: Primary | ICD-10-CM

## 2023-04-26 DIAGNOSIS — R06.02 SHORTNESS OF BREATH: ICD-10-CM

## 2023-04-26 DIAGNOSIS — G25.81 RESTLESS LEGS: ICD-10-CM

## 2023-04-26 DIAGNOSIS — K58.9 IRRITABLE BOWEL SYNDROME, UNSPECIFIED TYPE: Primary | ICD-10-CM

## 2023-04-26 PROCEDURE — 99214 OFFICE O/P EST MOD 30 MIN: CPT | Performed by: NURSE PRACTITIONER

## 2023-04-26 PROCEDURE — 99213 OFFICE O/P EST LOW 20 MIN: CPT | Performed by: SPECIALIST

## 2023-04-26 RX ORDER — PRAMIPEXOLE DIHYDROCHLORIDE 0.12 MG/1
TABLET ORAL
Qty: 60 TABLET | Refills: 2 | Status: SHIPPED | OUTPATIENT
Start: 2023-04-26

## 2023-04-26 RX ORDER — OMEPRAZOLE 20 MG/1
20 CAPSULE, DELAYED RELEASE ORAL
Qty: 30 CAPSULE | Refills: 2 | Status: SHIPPED | OUTPATIENT
Start: 2023-04-26

## 2023-04-26 RX ORDER — HYOSCYAMINE SULFATE 0.12 MG/1
125 TABLET SUBLINGUAL EVERY 4 HOURS PRN
Qty: 30 EACH | Refills: 3 | Status: SHIPPED | OUTPATIENT
Start: 2023-04-26

## 2023-04-26 RX ORDER — ZOLPIDEM TARTRATE 10 MG/1
10 TABLET ORAL NIGHTLY PRN
Qty: 30 TABLET | Refills: 2 | Status: SHIPPED | OUTPATIENT
Start: 2023-04-26 | End: 2023-05-26

## 2023-04-26 RX ORDER — DICYCLOMINE HCL 20 MG
20 TABLET ORAL EVERY 6 HOURS
COMMUNITY

## 2023-04-26 ASSESSMENT — ENCOUNTER SYMPTOMS
SHORTNESS OF BREATH: 1
ABDOMINAL DISTENTION: 0
BLOOD IN STOOL: 0
ABDOMINAL PAIN: 0
RECTAL PAIN: 0
COUGH: 0
CONSTIPATION: 0
DIARRHEA: 0
ANAL BLEEDING: 0
GASTROINTESTINAL NEGATIVE: 1
VOMITING: 0
EYES NEGATIVE: 1
RESPIRATORY NEGATIVE: 1
WHEEZING: 0
ABDOMINAL PAIN: 0
NAUSEA: 0
BLOOD IN STOOL: 0

## 2023-04-26 NOTE — PROGRESS NOTES
[address] [id number] [facility] PRIMARY CARE  1430 [facility] [address]  Dept: [phone]  Dept Fax: [phone]: 97 Liana Joy (: 1965) is a 56 y.o. female, Established patient, here for evaluation of the following chief complaint(s): Insomnia (Patient was started on Ambien and it is working for her. Still some trouble falling asleep but it has improved. She reports it does make her a little dizzy and her stomach a little queezy but since she is laying in bed she can handle it.), Leg Pain (RLS has not improved with Ambien. ), and Gastroesophageal Reflux (Patient reports Omeprazole is helping.)      PCP:  COOPER Stiles CNP      Here today for 1 month f/u after starting ambien for insomnia and omeprazole for chronic cough. Insomnia: Was started on ambien 5mg last visit and feels still takes forever to fall asleep. Took 3 hrs to fall asleep last night. Once falls asleep, stays asleep for 5-6 hours as has to get up for work by [employer]. She notes improvement with length of sleep compared to being up every hour before [place]. Some symptoms as listed in CC but tolerable. RLS: Feels her legs keep her from falling asleep. Hurt all the way up to her hips. Wonders if can try med for this now. Thinks this is why up forever each night. Chronic cough: Has greatly improved with startin omeprazole last visit. Is doing her flonase every night consistently which is an improvement. Has not done her pft yet. RA: Finally got appt with rheumatologist, so will be seen May 25th 1:55pm in [place] with Dr. Tim Mathews, needs all refaxed. Saw cardiology yesterday. Feels needs stress test d/t her shortness of breath. Review of Systems   Constitutional:  Positive for fatigue. Respiratory:  Positive for shortness of breath. Negative for cough and wheezing. Cardiovascular: Negative.

## 2023-08-28 ENCOUNTER — HOSPITAL ENCOUNTER (EMERGENCY)
Age: 58
Discharge: HOME | End: 2023-08-28
Attending: EMERGENCY MEDICINE
Payer: COMMERCIAL

## 2023-08-28 ENCOUNTER — APPOINTMENT (OUTPATIENT)
Dept: CT IMAGING | Age: 58
End: 2023-08-28
Payer: COMMERCIAL

## 2023-08-28 VITALS
RESPIRATION RATE: 16 BRPM | TEMPERATURE: 97.8 F | WEIGHT: 185 LBS | BODY MASS INDEX: 34.04 KG/M2 | SYSTOLIC BLOOD PRESSURE: 133 MMHG | OXYGEN SATURATION: 99 % | HEART RATE: 76 BPM | HEIGHT: 62 IN | DIASTOLIC BLOOD PRESSURE: 83 MMHG

## 2023-08-28 DIAGNOSIS — R31.9 HEMATURIA, UNSPECIFIED TYPE: ICD-10-CM

## 2023-08-28 DIAGNOSIS — R10.84 GENERALIZED ABDOMINAL PAIN: Primary | ICD-10-CM

## 2023-08-28 DIAGNOSIS — N20.0 KIDNEY STONE: ICD-10-CM

## 2023-08-28 LAB
ALBUMIN SERPL-MCNC: 4 G/DL (ref 3.5–4.6)
ALP SERPL-CCNC: 99 U/L (ref 40–130)
ALT SERPL-CCNC: 13 U/L (ref 0–33)
AMYLASE SERPL-CCNC: 57 U/L (ref 22–93)
ANION GAP SERPL CALCULATED.3IONS-SCNC: 11 MEQ/L (ref 9–15)
AST SERPL-CCNC: 20 U/L (ref 0–35)
BACTERIA URNS QL MICRO: ABNORMAL /HPF
BASOPHILS # BLD: 0.1 K/UL (ref 0–0.1)
BASOPHILS NFR BLD: 0.7 % (ref 0.1–1.2)
BILIRUB SERPL-MCNC: 0.5 MG/DL (ref 0.2–0.7)
BILIRUB UR QL STRIP: NEGATIVE
BUN SERPL-MCNC: 13 MG/DL (ref 6–20)
CALCIUM SERPL-MCNC: 9.4 MG/DL (ref 8.5–9.9)
CHLORIDE SERPL-SCNC: 109 MEQ/L (ref 95–107)
CLARITY UR: CLEAR
CO2 SERPL-SCNC: 25 MEQ/L (ref 20–31)
COLOR UR: YELLOW
CREAT SERPL-MCNC: 0.7 MG/DL (ref 0.5–0.9)
EOSINOPHIL # BLD: 0.2 K/UL (ref 0–0.4)
EOSINOPHIL NFR BLD: 2.3 % (ref 0.7–5.8)
ERYTHROCYTE [DISTWIDTH] IN BLOOD BY AUTOMATED COUNT: 14.4 % (ref 11.7–14.4)
GLOBULIN SER CALC-MCNC: 3.6 G/DL (ref 2.3–3.5)
GLUCOSE SERPL-MCNC: 125 MG/DL (ref 70–99)
GLUCOSE UR STRIP-MCNC: NEGATIVE MG/DL
HCT VFR BLD AUTO: 39.6 % (ref 37–47)
HGB BLD-MCNC: 12.7 G/DL (ref 11.2–15.7)
HGB UR QL STRIP: ABNORMAL
IMM GRANULOCYTES # BLD: 0 K/UL
IMM GRANULOCYTES NFR BLD: 0.4 %
KETONES UR STRIP-MCNC: NEGATIVE MG/DL
LACTATE BLDV-SCNC: 1.4 MMOL/L (ref 0.5–2.2)
LEUKOCYTE ESTERASE UR QL STRIP: ABNORMAL
LIPASE SERPL-CCNC: 20 U/L (ref 12–95)
LYMPHOCYTES # BLD: 2.3 K/UL (ref 1.2–3.7)
LYMPHOCYTES NFR BLD: 24.7 %
MCH RBC QN AUTO: 29.9 PG (ref 25.6–32.2)
MCHC RBC AUTO-ENTMCNC: 32.1 % (ref 32.2–35.5)
MCV RBC AUTO: 93.2 FL (ref 79.4–94.8)
MONOCYTES # BLD: 0.6 K/UL (ref 0.2–0.9)
MONOCYTES NFR BLD: 6.5 % (ref 4.7–12.5)
NEUTROPHILS # BLD: 6 K/UL (ref 1.6–6.1)
NEUTS SEG NFR BLD: 65.4 % (ref 34–71.1)
NITRITE UR QL STRIP: NEGATIVE
PH UR STRIP: 6 [PH] (ref 5–9)
PLATELET # BLD AUTO: 305 K/UL (ref 182–369)
POTASSIUM SERPL-SCNC: 3.5 MEQ/L (ref 3.4–4.9)
PROT SERPL-MCNC: 7.6 G/DL (ref 6.3–8)
PROT UR STRIP-MCNC: 100 MG/DL
RBC # BLD AUTO: 4.25 M/UL (ref 3.93–5.22)
RBC #/AREA URNS HPF: ABNORMAL /HPF (ref 0–2)
SODIUM SERPL-SCNC: 145 MEQ/L (ref 135–144)
SP GR UR STRIP: >=1.03 (ref 1–1.03)
URINE REFLEX TO CULTURE: ABNORMAL
UROBILINOGEN UR STRIP-ACNC: 0.2 E.U./DL
WBC # BLD AUTO: 9.1 K/UL (ref 4–10)
WBC #/AREA URNS HPF: ABNORMAL /HPF (ref 0–5)

## 2023-08-28 PROCEDURE — 82150 ASSAY OF AMYLASE: CPT

## 2023-08-28 PROCEDURE — 6360000002 HC RX W HCPCS: Performed by: EMERGENCY MEDICINE

## 2023-08-28 PROCEDURE — 85025 COMPLETE CBC W/AUTO DIFF WBC: CPT

## 2023-08-28 PROCEDURE — 96374 THER/PROPH/DIAG INJ IV PUSH: CPT

## 2023-08-28 PROCEDURE — 36415 COLL VENOUS BLD VENIPUNCTURE: CPT

## 2023-08-28 PROCEDURE — 83690 ASSAY OF LIPASE: CPT

## 2023-08-28 PROCEDURE — 2580000003 HC RX 258: Performed by: EMERGENCY MEDICINE

## 2023-08-28 PROCEDURE — 80053 COMPREHEN METABOLIC PANEL: CPT

## 2023-08-28 PROCEDURE — 99285 EMERGENCY DEPT VISIT HI MDM: CPT

## 2023-08-28 PROCEDURE — 74177 CT ABD & PELVIS W/CONTRAST: CPT

## 2023-08-28 PROCEDURE — 83605 ASSAY OF LACTIC ACID: CPT

## 2023-08-28 PROCEDURE — 81001 URINALYSIS AUTO W/SCOPE: CPT

## 2023-08-28 PROCEDURE — 96375 TX/PRO/DX INJ NEW DRUG ADDON: CPT

## 2023-08-28 PROCEDURE — 6360000004 HC RX CONTRAST MEDICATION: Performed by: EMERGENCY MEDICINE

## 2023-08-28 RX ORDER — OXYCODONE HYDROCHLORIDE AND ACETAMINOPHEN 5; 325 MG/1; MG/1
1 TABLET ORAL EVERY 6 HOURS PRN
Qty: 12 TABLET | Refills: 0 | Status: SHIPPED | OUTPATIENT
Start: 2023-08-28 | End: 2023-08-31

## 2023-08-28 RX ORDER — ONDANSETRON 2 MG/ML
4 INJECTION INTRAMUSCULAR; INTRAVENOUS ONCE
Status: COMPLETED | OUTPATIENT
Start: 2023-08-28 | End: 2023-08-28

## 2023-08-28 RX ORDER — 0.9 % SODIUM CHLORIDE 0.9 %
1000 INTRAVENOUS SOLUTION INTRAVENOUS ONCE
Status: COMPLETED | OUTPATIENT
Start: 2023-08-28 | End: 2023-08-28

## 2023-08-28 RX ORDER — MORPHINE SULFATE 2 MG/ML
2 INJECTION, SOLUTION INTRAMUSCULAR; INTRAVENOUS ONCE
Status: COMPLETED | OUTPATIENT
Start: 2023-08-28 | End: 2023-08-28

## 2023-08-28 RX ADMIN — ONDANSETRON 4 MG: 2 INJECTION INTRAMUSCULAR; INTRAVENOUS at 01:42

## 2023-08-28 RX ADMIN — MORPHINE SULFATE 2 MG: 2 INJECTION, SOLUTION INTRAMUSCULAR; INTRAVENOUS at 01:42

## 2023-08-28 RX ADMIN — IOPAMIDOL 75 ML: 755 INJECTION, SOLUTION INTRAVENOUS at 02:53

## 2023-08-28 RX ADMIN — SODIUM CHLORIDE 1000 ML: 9 INJECTION, SOLUTION INTRAVENOUS at 01:41

## 2023-08-28 ASSESSMENT — ENCOUNTER SYMPTOMS: ABDOMINAL PAIN: 1

## 2023-08-28 ASSESSMENT — PAIN DESCRIPTION - LOCATION: LOCATION: ABDOMEN

## 2023-08-28 ASSESSMENT — PAIN DESCRIPTION - ONSET: ONSET: ON-GOING

## 2023-08-28 ASSESSMENT — PAIN DESCRIPTION - PAIN TYPE: TYPE: ACUTE PAIN

## 2023-08-28 ASSESSMENT — PAIN SCALES - GENERAL: PAINLEVEL_OUTOF10: 10

## 2023-08-28 ASSESSMENT — PAIN DESCRIPTION - ORIENTATION: ORIENTATION: RIGHT;LOWER

## 2023-08-28 ASSESSMENT — PAIN - FUNCTIONAL ASSESSMENT
PAIN_FUNCTIONAL_ASSESSMENT: 0-10
PAIN_FUNCTIONAL_ASSESSMENT: ACTIVITIES ARE NOT PREVENTED

## 2023-08-28 ASSESSMENT — PAIN DESCRIPTION - FREQUENCY: FREQUENCY: CONTINUOUS

## 2023-08-28 NOTE — ED PROVIDER NOTES
[address] [facility] ED  EMERGENCY DEPARTMENT ENCOUNTER      Pt Name: Robbin Johnson  MRN: [id number]  [address] [facility] 1965  Date of evaluation: 8/28/2023  Provider: Ck Pierre DO        HISTORY OF PRESENT ILLNESS    Robbin Johnson is a 66 y.o. female per chart review has ah/o h.pylori,abdominal pain, GERD, asthma. She presents with abdominal pain. The history is provided by the patient. Abdominal Pain  Pain location:  Generalized  Pain quality: aching    Pain radiates to:  Does not radiate  Pain severity:  Severe  Onset quality:  Sudden  Timing:  Constant  Progression:  Worsening  Chronicity:  New  Relieved by:  Nothing  Worsened by:  Nothing  Ineffective treatments: Bowel activity, OTC medications and lying down  Associated symptoms: nausea and vomiting    Associated symptoms: no chest pain, no chills, no cough, no dysuria, no fever, no shortness of breath and no sore throat    Risk factors: multiple surgeries           REVIEW OF SYSTEMS       Review of Systems   Constitutional:  Negative for chills and fever. HENT:  Negative for ear pain and sore throat. Eyes:  Negative for discharge and redness. Respiratory:  Negative for cough and shortness of breath. Cardiovascular:  Negative for chest pain and palpitations. Gastrointestinal:  Positive for abdominal pain, nausea and vomiting. Genitourinary:  Negative for difficulty urinating and dysuria. Musculoskeletal:  Negative for back pain and neck pain. Skin:  Negative for rash and wound. Neurological:  Negative for dizziness and syncope. Psychiatric/Behavioral:  Negative for confusion. The patient is not nervous/anxious. All other systems reviewed and are negative. Except as noted above the remainder of the review of systems was reviewed and negative.        PAST MEDICAL HISTORY     Past Medical History:   Diagnosis Date    Epigastric pain 9/27/2022    Generalized abdominal pain 5/16/2022    H. pylori infection     Hyperplastic colonic

## 2023-08-31 ASSESSMENT — ENCOUNTER SYMPTOMS
SHORTNESS OF BREATH: 0
BACK PAIN: 0
COUGH: 0
EYE DISCHARGE: 0
SORE THROAT: 0
EYE REDNESS: 0
NAUSEA: 1
VOMITING: 1

## 2023-11-08 ENCOUNTER — OFFICE VISIT (OUTPATIENT)
Dept: GASTROENTEROLOGY | Age: 58
End: 2023-11-08
Payer: COMMERCIAL

## 2023-11-08 VITALS
OXYGEN SATURATION: 97 % | DIASTOLIC BLOOD PRESSURE: 69 MMHG | HEIGHT: 62 IN | WEIGHT: 184 LBS | BODY MASS INDEX: 33.86 KG/M2 | HEART RATE: 86 BPM | SYSTOLIC BLOOD PRESSURE: 114 MMHG

## 2023-11-08 DIAGNOSIS — K64.0 GRADE I HEMORRHOIDS: ICD-10-CM

## 2023-11-08 DIAGNOSIS — K58.1 IRRITABLE BOWEL SYNDROME WITH CONSTIPATION: Primary | ICD-10-CM

## 2023-11-08 PROCEDURE — 99213 OFFICE O/P EST LOW 20 MIN: CPT | Performed by: SPECIALIST

## 2023-11-08 RX ORDER — PREDNISONE 5 MG/1
TABLET ORAL
COMMUNITY
Start: 2023-09-27

## 2023-11-08 RX ORDER — HYOSCYAMINE SULFATE 0.12 MG/1
125 TABLET SUBLINGUAL EVERY 4 HOURS PRN
Qty: 30 EACH | Refills: 3 | Status: SHIPPED | OUTPATIENT
Start: 2023-11-08

## 2023-11-08 RX ORDER — HYDROXYCHLOROQUINE SULFATE 200 MG/1
200 TABLET, FILM COATED ORAL 2 TIMES DAILY
COMMUNITY
Start: 2023-09-27

## 2023-11-08 ASSESSMENT — ENCOUNTER SYMPTOMS
ANAL BLEEDING: 0
ABDOMINAL DISTENTION: 0
CONSTIPATION: 0
VOMITING: 0
ABDOMINAL PAIN: 0
RESPIRATORY NEGATIVE: 1
DIARRHEA: 0
GASTROINTESTINAL NEGATIVE: 1
RECTAL PAIN: 0
BLOOD IN STOOL: 0
NAUSEA: 0
EYES NEGATIVE: 1

## 2023-11-08 NOTE — PROGRESS NOTES
palpable mass. Musculoskeletal:         General: Normal range of motion. Cervical back: Normal range of motion. Skin:     General: Skin is warm. Neurological:      Mental Status: She is alert. Laboratory, Pathology, Radiology reviewed in detail with relevantimportant investigations summarized below:    No results for input(s): \"WBC\", \"HGB\", \"HCT\", \"MCV\", \"PLT\" in the last 720 hours. Lab Results   Component Value Date    ALT 13 08/28/2023    AST 20 08/28/2023    ALKPHOS 99 08/28/2023    BILITOT 0.5 08/28/2023     No results found. Endoscopic investigations:     Assessment and Plan:  Belinda Jackson 66 y.o. female for follow up. IBS controlled with Levsin as needed. Has occasional rectal bleeding from hemorrhoids. It seems patient has been straining regardless of the consistency of the stool. Would recommend using Preparation H suppository and if the bleeding persist will need hemorrhoid banding. No follow-ups on file. Khushbu Alcaraz MD   StaffGastroenterologist  [facility]    Please note this report has been partially produced using speech recognitionsoftware  and may cause contain errors related to that system including grammar, punctuation and spelling as well as words andphrases that may seem inappropriate. If there are questions or concerns please feel free to contact me to clarify.

## 2023-11-27 ENCOUNTER — OFFICE VISIT (OUTPATIENT)
Dept: INTERNAL MEDICINE | Age: 58
End: 2023-11-27
Payer: COMMERCIAL

## 2023-11-27 VITALS
SYSTOLIC BLOOD PRESSURE: 126 MMHG | WEIGHT: 186 LBS | RESPIRATION RATE: 16 BRPM | DIASTOLIC BLOOD PRESSURE: 72 MMHG | HEART RATE: 83 BPM | BODY MASS INDEX: 34.02 KG/M2 | TEMPERATURE: 98 F | OXYGEN SATURATION: 99 %

## 2023-11-27 DIAGNOSIS — R05.3 CHRONIC COUGH: ICD-10-CM

## 2023-11-27 DIAGNOSIS — G25.81 RESTLESS LEGS: ICD-10-CM

## 2023-11-27 DIAGNOSIS — M05.79 RHEUMATOID ARTHRITIS INVOLVING MULTIPLE SITES WITH POSITIVE RHEUMATOID FACTOR (HCC): ICD-10-CM

## 2023-11-27 DIAGNOSIS — S39.012A ACUTE MYOFASCIAL STRAIN OF LUMBAR REGION, INITIAL ENCOUNTER: Primary | ICD-10-CM

## 2023-11-27 DIAGNOSIS — R05.8 ALLERGIC COUGH: ICD-10-CM

## 2023-11-27 PROBLEM — R70.0 ELEVATED SEDIMENTATION RATE: Status: RESOLVED | Noted: 2022-09-29 | Resolved: 2023-11-27

## 2023-11-27 PROBLEM — R79.89 HIGH CYCLIC CITRULLINATED PEPTIDE (CCP) ANTIBODY LEVEL: Status: RESOLVED | Noted: 2022-09-29 | Resolved: 2023-11-27

## 2023-11-27 PROBLEM — R79.82 ELEVATED C-REACTIVE PROTEIN (CRP): Status: RESOLVED | Noted: 2022-09-29 | Resolved: 2023-11-27

## 2023-11-27 PROBLEM — I31.39 PERICARDIAL EFFUSION: Status: RESOLVED | Noted: 2023-03-06 | Resolved: 2023-11-27

## 2023-11-27 PROBLEM — M62.838 MUSCLE SPASM: Status: RESOLVED | Noted: 2022-09-22 | Resolved: 2023-11-27

## 2023-11-27 PROBLEM — M25.50 POLYARTHRALGIA: Status: RESOLVED | Noted: 2022-09-29 | Resolved: 2023-11-27

## 2023-11-27 PROCEDURE — 99214 OFFICE O/P EST MOD 30 MIN: CPT | Performed by: NURSE PRACTITIONER

## 2023-11-27 PROCEDURE — 96372 THER/PROPH/DIAG INJ SC/IM: CPT | Performed by: NURSE PRACTITIONER

## 2023-11-27 RX ORDER — OMEPRAZOLE 20 MG/1
20 CAPSULE, DELAYED RELEASE ORAL
Qty: 30 CAPSULE | Refills: 5 | Status: SHIPPED | OUTPATIENT
Start: 2023-11-27

## 2023-11-27 RX ORDER — METHYLPREDNISOLONE 4 MG/1
TABLET ORAL
Qty: 1 KIT | Refills: 0 | Status: SHIPPED | OUTPATIENT
Start: 2023-11-27 | End: 2023-12-03

## 2023-11-27 RX ORDER — TIZANIDINE 4 MG/1
4 TABLET ORAL EVERY 8 HOURS PRN
Qty: 30 TABLET | Refills: 0 | Status: SHIPPED | OUTPATIENT
Start: 2023-11-27

## 2023-11-27 RX ORDER — ALBUTEROL SULFATE 90 UG/1
1-2 AEROSOL, METERED RESPIRATORY (INHALATION) EVERY 4 HOURS PRN
Qty: 1 EACH | Refills: 3 | Status: SHIPPED | OUTPATIENT
Start: 2023-11-27

## 2023-11-27 RX ORDER — KETOROLAC TROMETHAMINE 30 MG/ML
60 INJECTION, SOLUTION INTRAMUSCULAR; INTRAVENOUS ONCE
Status: COMPLETED | OUTPATIENT
Start: 2023-11-27 | End: 2023-11-27

## 2023-11-27 RX ORDER — PREDNISONE 5 MG/1
5 TABLET ORAL 2 TIMES DAILY
Qty: 1 TABLET | Refills: 0
Start: 2023-11-27

## 2023-11-27 RX ORDER — PRAMIPEXOLE DIHYDROCHLORIDE 0.12 MG/1
TABLET ORAL
Qty: 60 TABLET | Refills: 5 | Status: SHIPPED | OUTPATIENT
Start: 2023-11-27

## 2023-11-27 RX ADMIN — KETOROLAC TROMETHAMINE 60 MG: 30 INJECTION, SOLUTION INTRAMUSCULAR; INTRAVENOUS at 15:20

## 2023-11-27 NOTE — PROGRESS NOTES
After obtaining consent, and per orders of Luis Carlos GAMBOA CNP, injection of Toradol given in Right upper quad. gluteus by Christen Zhu MA. Patient instructed to remain in clinic for 20 minutes afterwards, and to report any adverse reaction to me immediately.

## 2024-05-29 ENCOUNTER — OFFICE VISIT (OUTPATIENT)
Dept: INTERNAL MEDICINE | Age: 59
End: 2024-05-29
Payer: COMMERCIAL

## 2024-05-29 VITALS
WEIGHT: 172.6 LBS | HEIGHT: 62 IN | HEART RATE: 76 BPM | BODY MASS INDEX: 31.76 KG/M2 | SYSTOLIC BLOOD PRESSURE: 120 MMHG | RESPIRATION RATE: 16 BRPM | DIASTOLIC BLOOD PRESSURE: 74 MMHG | OXYGEN SATURATION: 96 %

## 2024-05-29 DIAGNOSIS — Z13.1 SCREENING FOR DIABETES MELLITUS: ICD-10-CM

## 2024-05-29 DIAGNOSIS — R07.89 INTERMITTENT LEFT-SIDED CHEST PAIN: ICD-10-CM

## 2024-05-29 DIAGNOSIS — M05.79 RHEUMATOID ARTHRITIS INVOLVING MULTIPLE SITES WITH POSITIVE RHEUMATOID FACTOR (HCC): ICD-10-CM

## 2024-05-29 DIAGNOSIS — I83.813 VARICOSE VEINS OF BOTH LOWER EXTREMITIES WITH PAIN: ICD-10-CM

## 2024-05-29 DIAGNOSIS — R00.2 PALPITATIONS: ICD-10-CM

## 2024-05-29 DIAGNOSIS — Z12.31 SCREENING MAMMOGRAM FOR BREAST CANCER: ICD-10-CM

## 2024-05-29 DIAGNOSIS — G25.81 RESTLESS LEGS: Primary | ICD-10-CM

## 2024-05-29 DIAGNOSIS — Z13.6 SCREENING, ISCHEMIC HEART DISEASE: ICD-10-CM

## 2024-05-29 LAB — HBA1C MFR BLD: 5.9 %

## 2024-05-29 PROCEDURE — 83036 HEMOGLOBIN GLYCOSYLATED A1C: CPT | Performed by: NURSE PRACTITIONER

## 2024-05-29 PROCEDURE — 99214 OFFICE O/P EST MOD 30 MIN: CPT | Performed by: NURSE PRACTITIONER

## 2024-05-29 RX ORDER — SULFASALAZINE 500 MG/1
500 TABLET ORAL 2 TIMES DAILY
COMMUNITY

## 2024-05-29 SDOH — ECONOMIC STABILITY: FOOD INSECURITY: WITHIN THE PAST 12 MONTHS, THE FOOD YOU BOUGHT JUST DIDN'T LAST AND YOU DIDN'T HAVE MONEY TO GET MORE.: NEVER TRUE

## 2024-05-29 SDOH — ECONOMIC STABILITY: FOOD INSECURITY: WITHIN THE PAST 12 MONTHS, YOU WORRIED THAT YOUR FOOD WOULD RUN OUT BEFORE YOU GOT MONEY TO BUY MORE.: NEVER TRUE

## 2024-05-29 SDOH — ECONOMIC STABILITY: INCOME INSECURITY: HOW HARD IS IT FOR YOU TO PAY FOR THE VERY BASICS LIKE FOOD, HOUSING, MEDICAL CARE, AND HEATING?: NOT HARD AT ALL

## 2024-05-29 ASSESSMENT — PATIENT HEALTH QUESTIONNAIRE - PHQ9
1. LITTLE INTEREST OR PLEASURE IN DOING THINGS: NOT AT ALL
SUM OF ALL RESPONSES TO PHQ9 QUESTIONS 1 & 2: 0
SUM OF ALL RESPONSES TO PHQ QUESTIONS 1-9: 0
2. FEELING DOWN, DEPRESSED OR HOPELESS: NOT AT ALL
SUM OF ALL RESPONSES TO PHQ QUESTIONS 1-9: 0

## 2024-05-29 NOTE — PROGRESS NOTES
[facility] PRIMARY CARE  [address] [place] [address]  Dept: [phone]  Dept Fax: [phone]  Loc: [phone]     SUBJECTIVE    Eliane Arguello (: 1965) is a 58 y.o. female, Established patient, here for evaluation of the following chief complaint(s):  6 Month Follow-Up (6 month follow up for chronic medication check up. ), Discuss Medications (At last visit patient was started on Miripex. Only taking one pill a night. Is helping some but the past three weeks has had a lot more leg cramping in the night. Waking up six times a night sometimes and has to \"walk it off\". ), other (Patient would like to schedule mammogram and pap today. Doesn't want pap today because she is going to work but does want to schedule. ), and Chest Pain (Chest pain continues. Having 2-3 times a week. Was found to have fluid around her heart last year and was referred to cardio but did not follow up. Would like a new referral to them. She feels she is ready to schedule now and get her health back on track.  passed last year. )      PCP:  Jody Garay, COOPER - CNP      Pt here for 6 month f/u.     Her RLS is really bothering her. Getting a lot of leg cramps, waking her frequently. Harder to walk it off. Is on mirapex 1 tablet, noticed an improvement especially in the beginning. Never bumped dose up. Has tizanidine for her back, but never tried it for this. Has been bothering her more lately. Is off school for summer, just doing \"store\", more so now.     She still is getting chest pain 2-3 times a week. Started a year ago, life was really busy so she ended up not seeing cardiology. Has two type of pain, one is a beating pounding pain and the other is a heavy pain over the left chest/ribs. Will last 5 min or less. If coughs some, pain seems to go away some.     Obesity: down 20 lbs since her last visit, was 186lb in Nov but admits was

## 2024-06-10 ENCOUNTER — HOSPITAL ENCOUNTER (OUTPATIENT)
Dept: WOMENS IMAGING | Age: 59
Discharge: HOME | End: 2024-06-12
Payer: COMMERCIAL

## 2024-06-10 DIAGNOSIS — Z12.31 SCREENING MAMMOGRAM FOR BREAST CANCER: ICD-10-CM

## 2024-06-10 PROCEDURE — 77063 BREAST TOMOSYNTHESIS BI: CPT

## 2024-06-17 ENCOUNTER — TELEPHONE (OUTPATIENT)
Dept: INTERNAL MEDICINE | Age: 59
End: 2024-06-17

## 2024-06-17 NOTE — TELEPHONE ENCOUNTER
Pt did not review my chart message. Please inform:      Please let pt know mammogram was normal. Recommend repeat mammo in 1 yr.

## 2024-07-01 DIAGNOSIS — R05.8 ALLERGIC COUGH: ICD-10-CM

## 2024-07-01 DIAGNOSIS — R07.89 INTERMITTENT LEFT-SIDED CHEST PAIN: ICD-10-CM

## 2024-07-01 DIAGNOSIS — Z13.6 SCREENING, ISCHEMIC HEART DISEASE: ICD-10-CM

## 2024-07-01 DIAGNOSIS — Z13.1 SCREENING FOR DIABETES MELLITUS: ICD-10-CM

## 2024-07-01 DIAGNOSIS — M05.79 RHEUMATOID ARTHRITIS INVOLVING MULTIPLE SITES WITH POSITIVE RHEUMATOID FACTOR (HCC): ICD-10-CM

## 2024-07-01 DIAGNOSIS — G25.81 RESTLESS LEGS: ICD-10-CM

## 2024-07-01 DIAGNOSIS — R00.2 PALPITATIONS: ICD-10-CM

## 2024-07-01 LAB
ALBUMIN SERPL-MCNC: 4 G/DL (ref 3.5–4.6)
ALP SERPL-CCNC: 78 U/L (ref 40–130)
ALT SERPL-CCNC: 14 U/L (ref 0–33)
ANION GAP SERPL CALCULATED.3IONS-SCNC: 10 MEQ/L (ref 9–15)
AST SERPL-CCNC: 26 U/L (ref 0–35)
BASOPHILS # BLD: 0 K/UL (ref 0–0.2)
BASOPHILS NFR BLD: 0.7 %
BILIRUB SERPL-MCNC: 0.3 MG/DL (ref 0.2–0.7)
BUN SERPL-MCNC: 11 MG/DL (ref 6–20)
CALCIUM SERPL-MCNC: 9.1 MG/DL (ref 8.5–9.9)
CHLORIDE SERPL-SCNC: 108 MEQ/L (ref 95–107)
CHOLEST SERPL-MCNC: 155 MG/DL (ref 0–199)
CO2 SERPL-SCNC: 24 MEQ/L (ref 20–31)
CREAT SERPL-MCNC: 0.56 MG/DL (ref 0.5–0.9)
EOSINOPHIL # BLD: 0.1 K/UL (ref 0–0.7)
EOSINOPHIL NFR BLD: 3 %
ERYTHROCYTE [DISTWIDTH] IN BLOOD BY AUTOMATED COUNT: 14.3 % (ref 11.5–14.5)
GLOBULIN SER CALC-MCNC: 2.8 G/DL (ref 2.3–3.5)
GLUCOSE FASTING: 92 MG/DL (ref 70–99)
HCT VFR BLD AUTO: 39.1 % (ref 37–47)
HDLC SERPL-MCNC: 59 MG/DL (ref 40–59)
HGB BLD-MCNC: 12.6 G/DL (ref 12–16)
LDL CHOLESTEROL: 88 MG/DL (ref 0–129)
LYMPHOCYTES # BLD: 1.1 K/UL (ref 1–4.8)
LYMPHOCYTES NFR BLD: 25.5 %
MCH RBC QN AUTO: 31.8 PG (ref 27–31.3)
MCHC RBC AUTO-ENTMCNC: 32.2 % (ref 33–37)
MCV RBC AUTO: 98.7 FL (ref 79.4–94.8)
MONOCYTES # BLD: 0.4 K/UL (ref 0.2–0.8)
MONOCYTES NFR BLD: 8 %
NEUTROPHILS # BLD: 2.7 K/UL (ref 1.4–6.5)
NEUTS SEG NFR BLD: 62.3 %
PLATELET # BLD AUTO: 243 K/UL (ref 130–400)
POTASSIUM SERPL-SCNC: 4.4 MEQ/L (ref 3.4–4.9)
PROT SERPL-MCNC: 6.8 G/DL (ref 6.3–8)
RBC # BLD AUTO: 3.96 M/UL (ref 4.2–5.4)
SODIUM SERPL-SCNC: 142 MEQ/L (ref 135–144)
TRIGLYCERIDE, FASTING: 41 MG/DL (ref 0–150)
TSH REFLEX: 1.13 UIU/ML (ref 0.44–3.86)
WBC # BLD AUTO: 4.4 K/UL (ref 4.8–10.8)

## 2024-07-01 RX ORDER — ALBUTEROL SULFATE 90 UG/1
1-2 AEROSOL, METERED RESPIRATORY (INHALATION) EVERY 4 HOURS PRN
Qty: 8.5 G | Refills: 3 | Status: SHIPPED | OUTPATIENT
Start: 2024-07-01

## 2024-07-01 NOTE — TELEPHONE ENCOUNTER
Comments:     Last Office Visit (last PCP visit):   5/29/2024    Next Visit Date:  Future Appointments   Date Time Provider Department Center   7/15/2024 11:30 AM Horn, Jody L, APRN - CNP [facility]   11/13/2024  3:00 PM Razack, João T, MD [facility]         Rx requested:  Requested Prescriptions     Pending Prescriptions Disp Refills    albuterol sulfate HFA (PROVENTIL;VENTOLIN;PROAIR) 108 (90 Base) MCG/ACT inhaler [Pharmacy Med Name: albuterol sulfate HFA 90 mcg/actuation aerosol inhaler] 8.5 g 3     Sig: Inhale 1-2 puffs into the lungs every 4 hours as needed for Wheezing or Shortness of Breath

## 2024-10-17 DIAGNOSIS — R05.3 CHRONIC COUGH: ICD-10-CM

## 2024-10-17 NOTE — TELEPHONE ENCOUNTER
Comments:     Last Office Visit (last PCP visit):   5/29/2024    Next Visit Date:  Future Appointments   Date Time Provider Department Center   11/13/2024  3:00 PM João Shafer MD [facility]       **If hasn't been seen in over a year OR hasn't followed up according to last diabetes/ADHD visit, make appointment for patient before sending refill to provider.    Rx requested:  Requested Prescriptions     Pending Prescriptions Disp Refills    omeprazole (PRILOSEC) 20 MG delayed release capsule [Pharmacy Med Name: omeprazole 20 mg capsule,delayed release] 30 capsule 5     Sig: Take 1 capsule by mouth every morning (before breakfast)

## 2024-11-13 ENCOUNTER — OFFICE VISIT (OUTPATIENT)
Dept: GASTROENTEROLOGY | Age: 59
End: 2024-11-13
Payer: COMMERCIAL

## 2024-11-13 VITALS — HEIGHT: 62 IN | BODY MASS INDEX: 31.1 KG/M2 | WEIGHT: 169 LBS | HEART RATE: 83 BPM | OXYGEN SATURATION: 99 %

## 2024-11-13 DIAGNOSIS — K58.8 OTHER IRRITABLE BOWEL SYNDROME: Primary | ICD-10-CM

## 2024-11-13 PROCEDURE — 99212 OFFICE O/P EST SF 10 MIN: CPT | Performed by: SPECIALIST

## 2024-11-13 RX ORDER — MEDROXYPROGESTERONE ACETATE 150 MG/ML
INJECTION, SUSPENSION INTRAMUSCULAR
COMMUNITY
Start: 2024-07-08

## 2024-11-13 ASSESSMENT — ENCOUNTER SYMPTOMS
CONSTIPATION: 0
NAUSEA: 0
GASTROINTESTINAL NEGATIVE: 1
RESPIRATORY NEGATIVE: 1
VOMITING: 0
ABDOMINAL PAIN: 0
RECTAL PAIN: 0
DIARRHEA: 0
EYES NEGATIVE: 1
ABDOMINAL DISTENTION: 0
ANAL BLEEDING: 0
BLOOD IN STOOL: 0

## 2024-11-13 NOTE — PROGRESS NOTES
Vaping status: Every Day    Substances: Nicotine   Substance and Sexual Activity    Alcohol use: No     Alcohol/week: 0.0 standard drinks of alcohol    Drug use: No     Social Determinants of Health     Financial Resource Strain: Low Risk  (5/29/2024)    Overall Financial Resource Strain (CARDIA)     Difficulty of Paying Living Expenses: Not hard at all   Food Insecurity: No Food Insecurity (5/29/2024)    Hunger Vital Sign     Worried About Running Out of Food in the Last Year: Never true     Ran Out of Food in the Last Year: Never true   Transportation Needs: Unknown (5/29/2024)    PRAPARE - Transportation     Lack of Transportation (Non-Medical): No   Housing Stability: Unknown (5/29/2024)    Housing Stability Vital Sign     Unstable Housing in the Last Year: No       Pulse 83, height 1.575 m (5' 2\"), weight 76.7 kg (169 lb), last menstrual period 04/01/2021, SpO2 99%, not currently breastfeeding.    Physical Exam  Constitutional:       Appearance: She is well-developed.   HENT:      Head: Normocephalic and atraumatic.   Eyes:      Conjunctiva/sclera: Conjunctivae normal.      Pupils: Pupils are equal, round, and reactive to light.   Cardiovascular:      Rate and Rhythm: Normal rate.   Pulmonary:      Effort: Pulmonary effort is normal.   Abdominal:      General: Bowel sounds are normal.      Palpations: Abdomen is soft.   Musculoskeletal:         General: Normal range of motion.      Cervical back: Normal range of motion.   Skin:     General: Skin is warm.   Neurological:      Mental Status: She is alert.         Laboratory, Pathology, Radiology reviewed indetail with relevant important investigations summarized below:  Lab Results   Component Value Date    WBC 4.4 (L) 07/01/2024    HGB 12.6 07/01/2024    HCT 39.1 07/01/2024    MCV 98.7 (H) 07/01/2024     07/01/2024     Lab Results   Component Value Date    ALT 14 07/01/2024    AST 26 07/01/2024    ALKPHOS 78 07/01/2024    BILITOT 0.3 07/01/2024

## 2025-02-04 ENCOUNTER — PATIENT MESSAGE (OUTPATIENT)
Dept: INTERNAL MEDICINE | Age: 60
End: 2025-02-04

## 2025-02-04 DIAGNOSIS — R05.8 ALLERGIC COUGH: Primary | ICD-10-CM

## 2025-02-05 DIAGNOSIS — R05.8 ALLERGIC COUGH: ICD-10-CM

## 2025-02-05 RX ORDER — ALBUTEROL SULFATE 90 UG/1
1-2 INHALANT RESPIRATORY (INHALATION) EVERY 4 HOURS PRN
Qty: 8.5 G | Refills: 3 | Status: SHIPPED | OUTPATIENT
Start: 2025-02-05

## 2025-02-05 RX ORDER — ALBUTEROL SULFATE 0.83 MG/ML
2.5 SOLUTION RESPIRATORY (INHALATION) EVERY 6 HOURS PRN
Qty: 100 EACH | Refills: 1 | Status: SHIPPED | OUTPATIENT
Start: 2025-02-05

## 2025-02-05 NOTE — TELEPHONE ENCOUNTER
Comments: ( please see Instant Opinionhart message. Pt requesting refill)    Last Office Visit (last PCP visit):   5/29/2024    Next Visit Date:  Future Appointments   Date Time Provider Department Center   11/26/2025  3:00 PM João Shafer MD [facility]       **If hasn't been seen in over a year OR hasn't followed up according to last diabetes/ADHD visit, make appointment for patient before sending refill to provider.    Rx requested:  Requested Prescriptions     Pending Prescriptions Disp Refills    albuterol sulfate HFA (PROVENTIL;VENTOLIN;PROAIR) 108 (90 Base) MCG/ACT inhaler 8.5 g 3     Sig: Inhale 1-2 puffs into the lungs every 4 hours as needed for Wheezing or Shortness of Breath

## 2025-02-06 ENCOUNTER — OFFICE VISIT (OUTPATIENT)
Dept: FAMILY MEDICINE CLINIC | Age: 60
End: 2025-02-06
Payer: COMMERCIAL

## 2025-02-06 VITALS
TEMPERATURE: 98.2 F | HEART RATE: 112 BPM | HEIGHT: 62 IN | SYSTOLIC BLOOD PRESSURE: 104 MMHG | DIASTOLIC BLOOD PRESSURE: 80 MMHG | BODY MASS INDEX: 30.55 KG/M2 | WEIGHT: 166 LBS | OXYGEN SATURATION: 98 %

## 2025-02-06 DIAGNOSIS — R05.9 COUGH, UNSPECIFIED TYPE: ICD-10-CM

## 2025-02-06 DIAGNOSIS — J02.9 SORE THROAT: ICD-10-CM

## 2025-02-06 DIAGNOSIS — J02.0 STREP THROAT: Primary | ICD-10-CM

## 2025-02-06 LAB
INFLUENZA A ANTIBODY: NORMAL
INFLUENZA B ANTIBODY: NORMAL
Lab: NORMAL
PERFORMING INSTRUMENT: NORMAL
QC PASS/FAIL: NORMAL
S PYO AG THROAT QL: POSITIVE
SARS-COV-2, POC: NORMAL

## 2025-02-06 PROCEDURE — 87880 STREP A ASSAY W/OPTIC: CPT | Performed by: PHYSICIAN ASSISTANT

## 2025-02-06 PROCEDURE — 87804 INFLUENZA ASSAY W/OPTIC: CPT | Performed by: PHYSICIAN ASSISTANT

## 2025-02-06 PROCEDURE — 87426 SARSCOV CORONAVIRUS AG IA: CPT | Performed by: PHYSICIAN ASSISTANT

## 2025-02-06 PROCEDURE — 99214 OFFICE O/P EST MOD 30 MIN: CPT | Performed by: PHYSICIAN ASSISTANT

## 2025-02-06 RX ORDER — AMOXICILLIN 500 MG/1
1000 CAPSULE ORAL DAILY
Qty: 20 CAPSULE | Refills: 0 | Status: SHIPPED | OUTPATIENT
Start: 2025-02-06 | End: 2025-02-16

## 2025-02-06 SDOH — ECONOMIC STABILITY: FOOD INSECURITY: WITHIN THE PAST 12 MONTHS, THE FOOD YOU BOUGHT JUST DIDN'T LAST AND YOU DIDN'T HAVE MONEY TO GET MORE.: NEVER TRUE

## 2025-02-06 SDOH — ECONOMIC STABILITY: FOOD INSECURITY: WITHIN THE PAST 12 MONTHS, YOU WORRIED THAT YOUR FOOD WOULD RUN OUT BEFORE YOU GOT MONEY TO BUY MORE.: NEVER TRUE

## 2025-02-06 ASSESSMENT — ENCOUNTER SYMPTOMS
EYES NEGATIVE: 1
COUGH: 1
SORE THROAT: 1
GASTROINTESTINAL NEGATIVE: 1

## 2025-02-06 ASSESSMENT — PATIENT HEALTH QUESTIONNAIRE - PHQ9: DEPRESSION UNABLE TO ASSESS: URGENT/EMERGENT SITUATION

## 2025-02-06 NOTE — PATIENT INSTRUCTIONS
Dr. Fredrick Vasquez Eat to Beat Disease and Eat to Beat your Diet  Search Dr. Fredrick Vasquez Grocery shopping guide   Swap soda for purified water not from a plastic water bottle and consume black coffee and green tea   Turmeric and cinnamon are good anti-inflammatory spices  Sterilize a replace your toothbrush in 24 hours after starting the antibiotics  Daily probiotic long-term to restore digestive jesús

## 2025-02-06 NOTE — PROGRESS NOTES
[facility] CARE  [address] [id number]  Dept: [phone]  Loc: [phone]     Pt Name: Eliane Arguello  MRN: [id number]  Birthdate 1965      HISTORY OF PRESENT ILLNESS    Eliane Arguello is a 59 y.o. female who presents to the [facility] with chief complaint of a two day history of sore throat, body aches, bilateral ear pain, and a cough that started today.  She admits to nausea without diarrhea.  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  She admits to slight nasal congestion.  Patient states her rheumatoid arthritis has also been flared up as well.        REVIEW OF SYSTEMS       Review of Systems   Constitutional:  Positive for chills and fever.   HENT:  Positive for congestion, ear pain and sore throat.    Eyes: Negative.    Respiratory:  Positive for cough.    Cardiovascular: Negative.    Gastrointestinal: Negative.    Endocrine: Negative.    Genitourinary: Negative.    Musculoskeletal:  Positive for myalgias.   Skin: Negative.    Neurological: Negative.    Psychiatric/Behavioral: Negative.           PAST MEDICAL HISTORY     Past Medical History:   Diagnosis Date    Elevated C-reactive protein (CRP) 9/29/2022    Elevated sedimentation rate 9/29/2022    Epigastric pain 9/27/2022    Generalized abdominal pain 5/16/2022    H. 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

## 2025-08-04 DIAGNOSIS — R05.3 CHRONIC COUGH: ICD-10-CM

## 2025-08-04 RX ORDER — OMEPRAZOLE 20 MG/1
20 CAPSULE, DELAYED RELEASE ORAL
Qty: 30 CAPSULE | Refills: 1 | Status: SHIPPED | OUTPATIENT
Start: 2025-08-04

## (undated) DEVICE — SINGLE PORT MANIFOLD: Brand: NEPTUNE 2

## (undated) DEVICE — TUBING, SUCTION, 1/4" X 10', STRAIGHT: Brand: MEDLINE

## (undated) DEVICE — TUBE SET 96 MM 64 MM H2O PERISTALTIC STD AUX CHANNEL

## (undated) DEVICE — CONMED SCOPE SAVER BITE BLOCK, 20X27 MM: Brand: SCOPE SAVER

## (undated) DEVICE — ENDO CARRY-ON PROCEDURE KIT: Brand: ENDO CARRY-ON PROCEDURE KIT

## (undated) DEVICE — FORCEPS BX L240CM JAW DIA2.8MM L CAP W/ NDL MIC MESH TOOTH

## (undated) DEVICE — BRUSH ENDO CLN L90.5IN SHTH DIA1.7MM BRIST DIA5-7MM 2-6MM

## (undated) DEVICE — Device: Brand: ENDO SMARTCAP